# Patient Record
Sex: FEMALE | Race: WHITE | Employment: FULL TIME | URBAN - METROPOLITAN AREA
[De-identification: names, ages, dates, MRNs, and addresses within clinical notes are randomized per-mention and may not be internally consistent; named-entity substitution may affect disease eponyms.]

---

## 2017-01-06 ENCOUNTER — OFFICE VISIT (OUTPATIENT)
Dept: FAMILY MEDICINE CLINIC | Age: 57
End: 2017-01-06

## 2017-01-06 VITALS
RESPIRATION RATE: 14 BRPM | BODY MASS INDEX: 25.57 KG/M2 | SYSTOLIC BLOOD PRESSURE: 122 MMHG | HEIGHT: 61 IN | HEART RATE: 76 BPM | DIASTOLIC BLOOD PRESSURE: 84 MMHG | WEIGHT: 135.4 LBS

## 2017-01-06 DIAGNOSIS — I10 ESSENTIAL HYPERTENSION: Primary | ICD-10-CM

## 2017-01-06 DIAGNOSIS — E03.9 ACQUIRED HYPOTHYROIDISM: ICD-10-CM

## 2017-01-06 PROCEDURE — 99213 OFFICE O/P EST LOW 20 MIN: CPT | Performed by: FAMILY MEDICINE

## 2017-01-06 RX ORDER — LEVOTHYROXINE SODIUM 0.07 MG/1
75 TABLET ORAL
Qty: 90 TABLET | Refills: 1 | Status: SHIPPED | OUTPATIENT
Start: 2017-01-06 | End: 2017-05-05 | Stop reason: SDUPTHER

## 2017-01-06 RX ORDER — HYDROCHLOROTHIAZIDE 25 MG/1
25 TABLET ORAL DAILY
Qty: 90 TABLET | Refills: 1 | Status: SHIPPED | OUTPATIENT
Start: 2017-01-06 | End: 2017-05-05 | Stop reason: SDUPTHER

## 2017-01-06 ASSESSMENT — ENCOUNTER SYMPTOMS
ABDOMINAL PAIN: 0
EYES NEGATIVE: 1
SHORTNESS OF BREATH: 0
VOMITING: 0
NAUSEA: 0
COUGH: 0
DIARRHEA: 0
CHEST TIGHTNESS: 0
CONSTIPATION: 0

## 2017-05-05 ENCOUNTER — OFFICE VISIT (OUTPATIENT)
Dept: FAMILY MEDICINE CLINIC | Age: 57
End: 2017-05-05

## 2017-05-05 VITALS
SYSTOLIC BLOOD PRESSURE: 116 MMHG | WEIGHT: 135.2 LBS | RESPIRATION RATE: 14 BRPM | HEIGHT: 61 IN | DIASTOLIC BLOOD PRESSURE: 78 MMHG | BODY MASS INDEX: 25.53 KG/M2 | HEART RATE: 72 BPM

## 2017-05-05 DIAGNOSIS — Z78.0 MENOPAUSE: ICD-10-CM

## 2017-05-05 DIAGNOSIS — I10 ESSENTIAL HYPERTENSION: Primary | ICD-10-CM

## 2017-05-05 DIAGNOSIS — Z12.4 CERVICAL CANCER SCREENING: ICD-10-CM

## 2017-05-05 DIAGNOSIS — E03.9 ACQUIRED HYPOTHYROIDISM: ICD-10-CM

## 2017-05-05 DIAGNOSIS — Z01.419 PAP SMEAR, AS PART OF ROUTINE GYNECOLOGICAL EXAMINATION: ICD-10-CM

## 2017-05-05 LAB
HEMOCCULT STL QL: NORMAL

## 2017-05-05 PROCEDURE — 82270 OCCULT BLOOD FECES: CPT | Performed by: FAMILY MEDICINE

## 2017-05-05 PROCEDURE — 99214 OFFICE O/P EST MOD 30 MIN: CPT | Performed by: FAMILY MEDICINE

## 2017-05-05 RX ORDER — LISINOPRIL 2.5 MG/1
2.5 TABLET ORAL DAILY
Qty: 90 TABLET | Refills: 1 | Status: SHIPPED | OUTPATIENT
Start: 2017-05-05 | End: 2017-09-18 | Stop reason: SDUPTHER

## 2017-05-05 RX ORDER — LEVOTHYROXINE SODIUM 0.07 MG/1
75 TABLET ORAL
Qty: 90 TABLET | Refills: 1 | Status: SHIPPED | OUTPATIENT
Start: 2017-05-05 | End: 2017-09-18 | Stop reason: SDUPTHER

## 2017-05-05 RX ORDER — HYDROCHLOROTHIAZIDE 25 MG/1
25 TABLET ORAL DAILY
Qty: 90 TABLET | Refills: 1 | Status: SHIPPED | OUTPATIENT
Start: 2017-05-05 | End: 2017-09-18 | Stop reason: SDUPTHER

## 2017-05-05 ASSESSMENT — ENCOUNTER SYMPTOMS
DIARRHEA: 0
COUGH: 0
ABDOMINAL PAIN: 0
CONSTIPATION: 0
NAUSEA: 0
VOMITING: 0
CHEST TIGHTNESS: 0
EYES NEGATIVE: 1
SHORTNESS OF BREATH: 0

## 2017-05-05 ASSESSMENT — PATIENT HEALTH QUESTIONNAIRE - PHQ9
1. LITTLE INTEREST OR PLEASURE IN DOING THINGS: 0
2. FEELING DOWN, DEPRESSED OR HOPELESS: 0
SUM OF ALL RESPONSES TO PHQ QUESTIONS 1-9: 0
SUM OF ALL RESPONSES TO PHQ9 QUESTIONS 1 & 2: 0

## 2017-05-10 LAB — GYNECOLOGY CYTOLOGY REPORT: NORMAL

## 2017-05-27 LAB
CHOLESTEROL, TOTAL: NORMAL MG/DL
CHOLESTEROL/HDL RATIO: NORMAL
HDLC SERPL-MCNC: NORMAL MG/DL (ref 35–70)
LDL CHOLESTEROL CALCULATED: 113 MG/DL (ref 0–160)
TRIGL SERPL-MCNC: NORMAL MG/DL
VLDLC SERPL CALC-MCNC: NORMAL MG/DL

## 2017-05-30 DIAGNOSIS — I10 ESSENTIAL HYPERTENSION: ICD-10-CM

## 2017-09-18 ENCOUNTER — OFFICE VISIT (OUTPATIENT)
Dept: FAMILY MEDICINE CLINIC | Age: 57
End: 2017-09-18

## 2017-09-18 VITALS
DIASTOLIC BLOOD PRESSURE: 68 MMHG | RESPIRATION RATE: 14 BRPM | HEART RATE: 78 BPM | HEIGHT: 61 IN | SYSTOLIC BLOOD PRESSURE: 112 MMHG | WEIGHT: 126.8 LBS | BODY MASS INDEX: 23.94 KG/M2

## 2017-09-18 DIAGNOSIS — E03.9 ACQUIRED HYPOTHYROIDISM: ICD-10-CM

## 2017-09-18 DIAGNOSIS — Z23 IMMUNIZATION DUE: ICD-10-CM

## 2017-09-18 DIAGNOSIS — I10 ESSENTIAL HYPERTENSION: ICD-10-CM

## 2017-09-18 PROCEDURE — 99213 OFFICE O/P EST LOW 20 MIN: CPT | Performed by: FAMILY MEDICINE

## 2017-09-18 PROCEDURE — 90688 IIV4 VACCINE SPLT 0.5 ML IM: CPT | Performed by: FAMILY MEDICINE

## 2017-09-18 PROCEDURE — 90471 IMMUNIZATION ADMIN: CPT | Performed by: FAMILY MEDICINE

## 2017-09-18 RX ORDER — LEVOTHYROXINE SODIUM 0.07 MG/1
75 TABLET ORAL
Qty: 90 TABLET | Refills: 1 | Status: SHIPPED | OUTPATIENT
Start: 2017-09-18 | End: 2018-03-14 | Stop reason: SDUPTHER

## 2017-09-18 RX ORDER — HYDROCHLOROTHIAZIDE 25 MG/1
25 TABLET ORAL DAILY
Qty: 90 TABLET | Refills: 1 | Status: SHIPPED | OUTPATIENT
Start: 2017-09-18 | End: 2018-03-14 | Stop reason: SDUPTHER

## 2017-09-18 RX ORDER — LISINOPRIL 2.5 MG/1
2.5 TABLET ORAL DAILY
Qty: 90 TABLET | Refills: 1 | Status: SHIPPED | OUTPATIENT
Start: 2017-09-18 | End: 2018-03-14 | Stop reason: SDUPTHER

## 2017-09-18 ASSESSMENT — ENCOUNTER SYMPTOMS
CONSTIPATION: 0
NAUSEA: 0
SHORTNESS OF BREATH: 0
VOMITING: 0
CHEST TIGHTNESS: 0
DIARRHEA: 0
ABDOMINAL PAIN: 0
EYES NEGATIVE: 1
COUGH: 0

## 2018-03-14 ENCOUNTER — OFFICE VISIT (OUTPATIENT)
Dept: FAMILY MEDICINE CLINIC | Age: 58
End: 2018-03-14

## 2018-03-14 VITALS
BODY MASS INDEX: 24.7 KG/M2 | HEART RATE: 84 BPM | SYSTOLIC BLOOD PRESSURE: 104 MMHG | RESPIRATION RATE: 14 BRPM | WEIGHT: 130.8 LBS | DIASTOLIC BLOOD PRESSURE: 68 MMHG | HEIGHT: 61 IN

## 2018-03-14 DIAGNOSIS — I10 ESSENTIAL HYPERTENSION: Primary | ICD-10-CM

## 2018-03-14 DIAGNOSIS — E03.9 ACQUIRED HYPOTHYROIDISM: ICD-10-CM

## 2018-03-14 PROCEDURE — 99213 OFFICE O/P EST LOW 20 MIN: CPT | Performed by: FAMILY MEDICINE

## 2018-03-14 RX ORDER — HYDROCHLOROTHIAZIDE 25 MG/1
25 TABLET ORAL DAILY
Qty: 90 TABLET | Refills: 1 | Status: SHIPPED | OUTPATIENT
Start: 2018-03-14 | End: 2018-07-20 | Stop reason: SDUPTHER

## 2018-03-14 RX ORDER — LISINOPRIL 2.5 MG/1
2.5 TABLET ORAL DAILY
Qty: 90 TABLET | Refills: 1 | Status: SHIPPED | OUTPATIENT
Start: 2018-03-14 | End: 2018-07-20 | Stop reason: SDUPTHER

## 2018-03-14 RX ORDER — LEVOTHYROXINE SODIUM 0.07 MG/1
75 TABLET ORAL
Qty: 90 TABLET | Refills: 1 | Status: SHIPPED | OUTPATIENT
Start: 2018-03-14 | End: 2018-07-20 | Stop reason: SDUPTHER

## 2018-03-14 ASSESSMENT — ENCOUNTER SYMPTOMS
SHORTNESS OF BREATH: 0
ABDOMINAL PAIN: 0
NAUSEA: 0
COUGH: 0
CONSTIPATION: 0
CHEST TIGHTNESS: 0
EYES NEGATIVE: 1
DIARRHEA: 0
VOMITING: 0

## 2018-03-14 NOTE — PROGRESS NOTES
Visit Date: 3/14/2018    Amee Zaidi is a 61 y.o. female who presents today for:  Chief Complaint   Patient presents with    Hypertension stable    Hypothyroidism  She moved in with her boyfriend and she is very happy. HPI:     HPI    has a current medication list which includes the following prescription(s): hydrochlorothiazide, levothyroxine, lisinopril, bisacodyl, polyethylene glycol, ketotifen fumarate, calcium carbonate-vitamin d, aspirin, and multiple vitamin. Allergies   Allergen Reactions    Morphine Nausea And Vomiting       Social History   Substance Use Topics    Smoking status: Never Smoker    Smokeless tobacco: Never Used    Alcohol use No       Past Medical History:   Diagnosis Date    Anemia     Diverticulosis 10/10    Hypertension 5/06    Hypothyroidism 10/14/05    Mild carotid artery disease ([address] 7/06    Unspecified sleep apnea 10/05       Past Surgical History:   Procedure Laterality Date    BLADDER SUSPENSION      COLONOSCOPY  10/10 01/11    DILATION AND CURETTAGE OF UTERUS  1985/ 1988    HYSTERECTOMY  6/01    TUBAL LIGATION  1989       Family History   Problem Relation Age of Onset    Cancer Mother     Lung Cancer Mother     Heart Disease Father      Subjective:      Review of Systems   Constitutional: Negative for activity change, appetite change, diaphoresis, fatigue and fever. HENT: Negative. Eyes: Negative. Respiratory: Negative for cough, chest tightness and shortness of breath. Cardiovascular: Negative for chest pain, palpitations and leg swelling. Gastrointestinal: Negative for abdominal pain, constipation, diarrhea, nausea and vomiting. Genitourinary: Negative. Musculoskeletal: Negative. Skin: Negative. Negative for rash. Neurological: Negative for dizziness, syncope, weakness, light-headedness, numbness and headaches. Psychiatric/Behavioral: Negative.         Objective:   /68 (Site: Right Arm, Position: Sitting, Cuff weekly    lisinopril (PRINIVIL;ZESTRIL) 2.5 MG tablet 90 tablet 1     Sig: Take 1 tablet by mouth daily     Current Outpatient Prescriptions   Medication Sig Dispense Refill    hydrochlorothiazide (HYDRODIURIL) 25 MG tablet Take 1 tablet by mouth daily 90 tablet 1    levothyroxine (LEVOTHROID) 75 MCG tablet Take 1 tablet by mouth Six times weekly 90 tablet 1    lisinopril (PRINIVIL;ZESTRIL) 2.5 MG tablet Take 1 tablet by mouth daily 90 tablet 1    bisacodyl (DULCOLAX) 5 MG EC tablet See Prep Instructions 4 tablet 0    polyethylene glycol (GLYCOLAX) powder Colonoscopy Prep Dispense 255 Gram Bottle. Use as Directed 255 g 0    Ketotifen Fumarate (ALLERGY EYE DROPS OP) Apply 1 drop to eye 2 times daily.  Calcium Carbonate-Vitamin D (CALCIUM + D) 600-200 MG-UNIT TABS   Take 1 tablet by mouth daily       ASPIRIN Take 325 mg by mouth daily.  Multiple Vitamin (MULTIVITAMINS PO)   Take 1 tablet by mouth daily        No current facility-administered medications for this visit. Orders Placed This Encounter   Procedures    Comprehensive Metabolic Panel     Standing Status:   Future     Standing Expiration Date:   3/14/2019    Lipid Panel     Standing Status:   Future     Standing Expiration Date:   3/14/2019     Order Specific Question:   Is Patient Fasting?/# of Hours     Answer:   yes 12 hours    TSH without Reflex     Standing Status:   Future     Standing Expiration Date:   3/14/2019    CBC with Differential     Standing Status:   Future     Standing Expiration Date:   3/14/2019     Continue current medications. Return in about 4 months (around 7/14/2018) for HTN. Discussed use, benefit, and side effects of prescribed medications. All patient questions answered. Pt voiced understanding. Instructed to continue current medications, diet and exercise. Patient agreed with treatment plan.

## 2018-03-23 ENCOUNTER — TELEPHONE (OUTPATIENT)
Dept: FAMILY MEDICINE CLINIC | Age: 58
End: 2018-03-23

## 2018-03-23 DIAGNOSIS — E03.9 ACQUIRED HYPOTHYROIDISM: ICD-10-CM

## 2018-03-23 DIAGNOSIS — I10 ESSENTIAL HYPERTENSION: ICD-10-CM

## 2018-03-23 LAB
CHOLESTEROL, TOTAL: NORMAL MG/DL
CHOLESTEROL/HDL RATIO: NORMAL
HDLC SERPL-MCNC: NORMAL MG/DL (ref 35–70)
LDL CHOLESTEROL CALCULATED: 96 MG/DL (ref 0–160)
TRIGL SERPL-MCNC: NORMAL MG/DL
VLDLC SERPL CALC-MCNC: NORMAL MG/DL

## 2018-07-20 ENCOUNTER — OFFICE VISIT (OUTPATIENT)
Dept: FAMILY MEDICINE CLINIC | Age: 58
End: 2018-07-20

## 2018-07-20 VITALS
SYSTOLIC BLOOD PRESSURE: 102 MMHG | HEART RATE: 76 BPM | RESPIRATION RATE: 14 BRPM | HEIGHT: 61 IN | BODY MASS INDEX: 24.47 KG/M2 | DIASTOLIC BLOOD PRESSURE: 68 MMHG | WEIGHT: 129.6 LBS

## 2018-07-20 DIAGNOSIS — Z12.39 BREAST CANCER SCREENING: ICD-10-CM

## 2018-07-20 DIAGNOSIS — E03.9 ACQUIRED HYPOTHYROIDISM: ICD-10-CM

## 2018-07-20 DIAGNOSIS — J34.89 LESION OF NOSE: ICD-10-CM

## 2018-07-20 DIAGNOSIS — I10 ESSENTIAL HYPERTENSION: Primary | ICD-10-CM

## 2018-07-20 PROCEDURE — 99213 OFFICE O/P EST LOW 20 MIN: CPT | Performed by: FAMILY MEDICINE

## 2018-07-20 RX ORDER — LEVOTHYROXINE SODIUM 0.07 MG/1
75 TABLET ORAL
Qty: 90 TABLET | Refills: 1 | Status: SHIPPED | OUTPATIENT
Start: 2018-07-20 | End: 2018-07-23 | Stop reason: DRUGHIGH

## 2018-07-20 RX ORDER — LISINOPRIL 2.5 MG/1
2.5 TABLET ORAL DAILY
Qty: 90 TABLET | Refills: 1 | Status: SHIPPED | OUTPATIENT
Start: 2018-07-20 | End: 2018-11-30 | Stop reason: ALTCHOICE

## 2018-07-20 RX ORDER — HYDROCHLOROTHIAZIDE 25 MG/1
25 TABLET ORAL DAILY
Qty: 90 TABLET | Refills: 1 | Status: SHIPPED | OUTPATIENT
Start: 2018-07-20 | End: 2018-11-30 | Stop reason: SDUPTHER

## 2018-07-20 ASSESSMENT — ENCOUNTER SYMPTOMS
NAUSEA: 0
ABDOMINAL PAIN: 0
VOMITING: 0
CHEST TIGHTNESS: 0
CONSTIPATION: 0
EYES NEGATIVE: 1
COUGH: 0
SHORTNESS OF BREATH: 0
DIARRHEA: 0

## 2018-07-20 ASSESSMENT — PATIENT HEALTH QUESTIONNAIRE - PHQ9
SUM OF ALL RESPONSES TO PHQ9 QUESTIONS 1 & 2: 0
1. LITTLE INTEREST OR PLEASURE IN DOING THINGS: 0
2. FEELING DOWN, DEPRESSED OR HOPELESS: 0
SUM OF ALL RESPONSES TO PHQ QUESTIONS 1-9: 0

## 2018-07-20 NOTE — PROGRESS NOTES
Adult)   Pulse 76   Resp 14   Ht 5' 1\" (1.549 m)   Wt 129 lb 9.6 oz (58.8 kg)   Breastfeeding? No   BMI 24.49 kg/m²   Wt Readings from Last 3 Encounters:   07/20/18 129 lb 9.6 oz (58.8 kg)   03/14/18 130 lb 12.8 oz (59.3 kg)   09/18/17 126 lb 12.8 oz (57.5 kg)     Physical Exam   Constitutional: She is oriented to person, place, and time. She appears well-developed and well-nourished. No distress. HENT:   Head: Normocephalic and atraumatic. Eyes: Conjunctivae and EOM are normal. Pupils are equal, round, and reactive to light. Right eye exhibits no discharge. Left eye exhibits no discharge. No scleral icterus. Neck: Normal range of motion. Neck supple. No JVD present. No thyromegaly present. Cardiovascular: Normal rate, regular rhythm and normal heart sounds. No murmur heard. Pulmonary/Chest: Breath sounds normal. No respiratory distress. She has no wheezes. She has no rhonchi. She has no rales. Abdominal: Soft. Bowel sounds are normal. She exhibits no distension and no mass. There is no hepatosplenomegaly. There is no tenderness. There is no rebound and no guarding. Musculoskeletal: Normal range of motion. Lymphadenopathy:     She has no cervical adenopathy. Neurological: She is alert and oriented to person, place, and time. Skin: Skin is warm and dry. She is not diaphoretic. She has a nose lesion that needs to be further evaluated. Psychiatric: She has a normal mood and affect. Her behavior is normal.   Nursing note and vitals reviewed. Assessment:       Diagnosis Orders   1. Essential hypertension     2. Acquired hypothyroidism  levothyroxine (LEVOTHROID) 75 MCG tablet    TSH without Reflex   3. Lesion of nose  Kelli Walton MD, Cindy Marks MD   4.  Breast cancer screening  VIVIANE DIGITAL SCREEN W CAD BILATERAL       Plan:      Requested Prescriptions     Signed Prescriptions Disp Refills    lisinopril (PRINIVIL;ZESTRIL) 2.5 MG tablet 90 tablet 1     Sig: Take 1 tablet by mouth daily    levothyroxine (LEVOTHROID) 75 MCG tablet 90 tablet 1     Sig: Take 1 tablet by mouth Six times weekly    hydrochlorothiazide (HYDRODIURIL) 25 MG tablet 90 tablet 1     Sig: Take 1 tablet by mouth daily     Current Outpatient Prescriptions   Medication Sig Dispense Refill    lisinopril (PRINIVIL;ZESTRIL) 2.5 MG tablet Take 1 tablet by mouth daily 90 tablet 1    levothyroxine (LEVOTHROID) 75 MCG tablet Take 1 tablet by mouth Six times weekly 90 tablet 1    hydrochlorothiazide (HYDRODIURIL) 25 MG tablet Take 1 tablet by mouth daily 90 tablet 1    Ketotifen Fumarate (ALLERGY EYE DROPS OP) Apply 1 drop to eye 2 times daily.  Calcium Carbonate-Vitamin D (CALCIUM + D) 600-200 MG-UNIT TABS   Take 1 tablet by mouth daily       ASPIRIN Take 325 mg by mouth daily.  Multiple Vitamin (MULTIVITAMINS PO)   Take 1 tablet by mouth daily        No current facility-administered medications for this visit. Orders Placed This Encounter   Procedures    VIVIANE DIGITAL SCREEN W CAD BILATERAL     Standing Status:   Future     Standing Expiration Date:   7/20/2019    TSH without Reflex     Standing Status:   Future     Standing Expiration Date:   7/20/2019   Olya Mtz MD, Marianne Gordon MD     Referral Priority:   Routine     Referral Type:   Eval and Treat     Referral Reason:   Specialty Services Required     Referred to Provider:   Ely Mccann MD     Requested Specialty:   Dermatology     Number of Visits Requested:   1     Continue current medications. Return in about 4 months (around 11/20/2018) for HTN. Discussed use, benefit, and side effects of prescribed medications. All patient questions answered. Pt voiced understanding. Instructed to continue current medications, diet and exercise. Patient agreed with treatment plan.

## 2018-07-23 ENCOUNTER — TELEPHONE (OUTPATIENT)
Dept: FAMILY MEDICINE CLINIC | Age: 58
End: 2018-07-23

## 2018-07-23 DIAGNOSIS — E03.9 ACQUIRED HYPOTHYROIDISM: ICD-10-CM

## 2018-07-23 DIAGNOSIS — E03.9 ACQUIRED HYPOTHYROIDISM: Primary | ICD-10-CM

## 2018-07-23 RX ORDER — LEVOTHYROXINE SODIUM 0.1 MG/1
100 TABLET ORAL DAILY
Qty: 30 TABLET | Refills: 3 | Status: SHIPPED | OUTPATIENT
Start: 2018-07-23 | End: 2018-10-08 | Stop reason: DRUGHIGH

## 2018-10-08 ENCOUNTER — TELEPHONE (OUTPATIENT)
Dept: FAMILY MEDICINE CLINIC | Age: 58
End: 2018-10-08

## 2018-10-08 DIAGNOSIS — E03.9 ACQUIRED HYPOTHYROIDISM: ICD-10-CM

## 2018-10-08 DIAGNOSIS — E03.9 ACQUIRED HYPOTHYROIDISM: Primary | ICD-10-CM

## 2018-10-08 RX ORDER — LEVOTHYROXINE SODIUM 0.07 MG/1
75 TABLET ORAL DAILY
Qty: 30 TABLET | Refills: 3 | Status: SHIPPED | OUTPATIENT
Start: 2018-10-08 | End: 2019-01-07

## 2018-11-30 ENCOUNTER — OFFICE VISIT (OUTPATIENT)
Dept: FAMILY MEDICINE CLINIC | Age: 58
End: 2018-11-30

## 2018-11-30 VITALS
BODY MASS INDEX: 25.94 KG/M2 | DIASTOLIC BLOOD PRESSURE: 68 MMHG | RESPIRATION RATE: 14 BRPM | HEIGHT: 61 IN | SYSTOLIC BLOOD PRESSURE: 92 MMHG | WEIGHT: 137.4 LBS | HEART RATE: 74 BPM

## 2018-11-30 DIAGNOSIS — Z12.11 SCREEN FOR COLON CANCER: ICD-10-CM

## 2018-11-30 DIAGNOSIS — E03.9 ACQUIRED HYPOTHYROIDISM: ICD-10-CM

## 2018-11-30 DIAGNOSIS — I10 ESSENTIAL HYPERTENSION: Primary | ICD-10-CM

## 2018-11-30 LAB
HEMOCCULT STL QL: NORMAL

## 2018-11-30 PROCEDURE — 82270 OCCULT BLOOD FECES: CPT | Performed by: FAMILY MEDICINE

## 2018-11-30 PROCEDURE — 99213 OFFICE O/P EST LOW 20 MIN: CPT | Performed by: FAMILY MEDICINE

## 2018-11-30 RX ORDER — HYDROCHLOROTHIAZIDE 25 MG/1
25 TABLET ORAL DAILY
Qty: 90 TABLET | Refills: 1 | Status: SHIPPED | OUTPATIENT
Start: 2018-11-30 | End: 2019-04-05 | Stop reason: SDUPTHER

## 2018-11-30 RX ORDER — LISINOPRIL 2.5 MG/1
2.5 TABLET ORAL DAILY
Qty: 90 TABLET | Refills: 1 | Status: CANCELLED | OUTPATIENT
Start: 2018-11-30

## 2018-11-30 ASSESSMENT — ENCOUNTER SYMPTOMS
ABDOMINAL PAIN: 0
EYES NEGATIVE: 1
CONSTIPATION: 0
NAUSEA: 0
SHORTNESS OF BREATH: 0
CHEST TIGHTNESS: 0
DIARRHEA: 0
VOMITING: 0
COUGH: 0

## 2018-12-13 ENCOUNTER — NURSE ONLY (OUTPATIENT)
Dept: FAMILY MEDICINE CLINIC | Age: 58
End: 2018-12-13

## 2018-12-13 VITALS — DIASTOLIC BLOOD PRESSURE: 78 MMHG | SYSTOLIC BLOOD PRESSURE: 138 MMHG

## 2018-12-17 DIAGNOSIS — E03.9 ACQUIRED HYPOTHYROIDISM: ICD-10-CM

## 2019-01-05 DIAGNOSIS — E03.9 ACQUIRED HYPOTHYROIDISM: ICD-10-CM

## 2019-01-07 RX ORDER — LEVOTHYROXINE SODIUM 0.07 MG/1
TABLET ORAL
Qty: 90 TABLET | Refills: 1 | Status: SHIPPED | OUTPATIENT
Start: 2019-01-07 | End: 2019-04-05 | Stop reason: SDUPTHER

## 2019-04-05 ENCOUNTER — OFFICE VISIT (OUTPATIENT)
Dept: FAMILY MEDICINE CLINIC | Age: 59
End: 2019-04-05

## 2019-04-05 VITALS
SYSTOLIC BLOOD PRESSURE: 130 MMHG | HEART RATE: 72 BPM | BODY MASS INDEX: 27 KG/M2 | HEIGHT: 61 IN | DIASTOLIC BLOOD PRESSURE: 82 MMHG | RESPIRATION RATE: 14 BRPM | WEIGHT: 143 LBS

## 2019-04-05 DIAGNOSIS — I10 ESSENTIAL HYPERTENSION: Primary | ICD-10-CM

## 2019-04-05 DIAGNOSIS — E03.9 ACQUIRED HYPOTHYROIDISM: ICD-10-CM

## 2019-04-05 LAB
CHOLESTEROL, TOTAL: NORMAL MG/DL
CHOLESTEROL/HDL RATIO: NORMAL
HDLC SERPL-MCNC: NORMAL MG/DL (ref 35–70)
LDL CHOLESTEROL CALCULATED: 110 MG/DL (ref 0–160)
TRIGL SERPL-MCNC: NORMAL MG/DL
VLDLC SERPL CALC-MCNC: NORMAL MG/DL

## 2019-04-05 PROCEDURE — 99213 OFFICE O/P EST LOW 20 MIN: CPT | Performed by: FAMILY MEDICINE

## 2019-04-05 RX ORDER — HYDROCHLOROTHIAZIDE 25 MG/1
25 TABLET ORAL DAILY
Qty: 90 TABLET | Refills: 1 | Status: SHIPPED | OUTPATIENT
Start: 2019-04-05 | End: 2019-12-20 | Stop reason: SDUPTHER

## 2019-04-05 RX ORDER — CYCLOSPORINE 0.5 MG/ML
1 EMULSION OPHTHALMIC 2 TIMES DAILY
COMMUNITY

## 2019-04-05 RX ORDER — LEVOTHYROXINE SODIUM 0.07 MG/1
75 TABLET ORAL DAILY
Qty: 90 TABLET | Refills: 1 | Status: SHIPPED | OUTPATIENT
Start: 2019-04-05 | End: 2019-11-14 | Stop reason: SDUPTHER

## 2019-04-05 ASSESSMENT — ENCOUNTER SYMPTOMS
COUGH: 0
CHEST TIGHTNESS: 0
SHORTNESS OF BREATH: 0
CONSTIPATION: 0
DIARRHEA: 0
EYES NEGATIVE: 1
VOMITING: 0
NAUSEA: 0
ABDOMINAL PAIN: 0

## 2019-04-05 NOTE — PROGRESS NOTES
Date: 4/5/2019    Regina Ford is a 60 y.o. female who presents today for:  Chief Complaint   Patient presents with    Hypertension stable    Hypothyroidism last blood work was in December. She is doing good. HPI:     HPI    has a current medication list which includes the following prescription(s): cyclosporine, hydrochlorothiazide, levothyroxine, calcium carbonate-vitamin d, aspirin, and multiple vitamin. Allergies   Allergen Reactions    Morphine Nausea And Vomiting       Social History     Tobacco Use    Smoking status: Never Smoker    Smokeless tobacco: Never Used   Substance Use Topics    Alcohol use: No    Drug use: No       Past Medical History:   Diagnosis Date    Anemia     Diverticulosis 10/10    Hypertension 5/06    Hypothyroidism 10/14/05    Mild carotid artery disease ([address] 7/06    Unspecified sleep apnea 10/05       Past Surgical History:   Procedure Laterality Date    BLADDER SUSPENSION      COLONOSCOPY  10/10 01/11    DILATION AND CURETTAGE OF UTERUS  1985/ 1988    HYSTERECTOMY  6/01    TUBAL LIGATION  1989       Family History   Problem Relation Age of Onset    Cancer Mother     Lung Cancer Mother     Heart Disease Father      Subjective:     Review of Systems   Constitutional: Negative for activity change, appetite change, diaphoresis, fatigue and fever. HENT: Negative. Eyes: Negative. Respiratory: Negative for cough, chest tightness and shortness of breath. Cardiovascular: Negative for chest pain, palpitations and leg swelling. Gastrointestinal: Negative for abdominal pain, constipation, diarrhea, nausea and vomiting. Genitourinary: Negative. Musculoskeletal: Negative. Skin: Negative. Negative for rash. Neurological: Negative for dizziness, syncope, weakness, light-headedness, numbness and headaches.    Psychiatric/Behavioral: Negative.        :   /82 (Site: Left Upper Arm, Position: Sitting, Cuff Size: Medium Adult)   Pulse 72 Resp 14   Ht 5' 1\" (1.549 m)   Wt 143 lb (64.9 kg)   Breastfeeding? No   BMI 27.02 kg/m²   Wt Readings from Last 3 Encounters:   04/05/19 143 lb (64.9 kg)   11/30/18 137 lb 6.4 oz (62.3 kg)   07/20/18 129 lb 9.6 oz (58.8 kg)     Physical Exam   Constitutional: She is oriented to person, place, and time. She appears well-developed and well-nourished. No distress. HENT:   Head: Normocephalic and atraumatic. Eyes: Pupils are equal, round, and reactive to light. Conjunctivae and EOM are normal. Right eye exhibits no discharge. Left eye exhibits no discharge. No scleral icterus. Neck: Normal range of motion. Neck supple. No JVD present. No thyromegaly present. Cardiovascular: Normal rate, regular rhythm and normal heart sounds. No murmur heard. Pulmonary/Chest: Breath sounds normal. No respiratory distress. She has no wheezes. She has no rhonchi. She has no rales. Abdominal: Soft. Bowel sounds are normal. She exhibits no distension and no mass. There is no hepatosplenomegaly. There is no tenderness. There is no rebound and no guarding. Musculoskeletal: Normal range of motion. Lymphadenopathy:     She has no cervical adenopathy. Neurological: She is alert and oriented to person, place, and time. Skin: Skin is warm and dry. No rash noted. She is not diaphoretic. Psychiatric: She has a normal mood and affect. Her behavior is normal.   Nursing note and vitals reviewed.    :       Diagnosis Orders   1. Essential hypertension  Comprehensive Metabolic Panel    CBC Auto Differential    Lipid Panel   2.  Acquired hypothyroidism  levothyroxine (SYNTHROID) 75 MCG tablet       :      Requested Prescriptions     Signed Prescriptions Disp Refills    hydrochlorothiazide (HYDRODIURIL) 25 MG tablet 90 tablet 1     Sig: Take 1 tablet by mouth daily    levothyroxine (SYNTHROID) 75 MCG tablet 90 tablet 1     Sig: Take 1 tablet by mouth Daily     Current Outpatient Medications   Medication Sig Dispense Refill  cycloSPORINE (RESTASIS) 0.05 % ophthalmic emulsion 1 drop 2 times daily      hydrochlorothiazide (HYDRODIURIL) 25 MG tablet Take 1 tablet by mouth daily 90 tablet 1    levothyroxine (SYNTHROID) 75 MCG tablet Take 1 tablet by mouth Daily 90 tablet 1    Calcium Carbonate-Vitamin D (CALCIUM + D) 600-200 MG-UNIT TABS   Take 1 tablet by mouth daily       ASPIRIN Take 325 mg by mouth daily.  Multiple Vitamin (MULTIVITAMINS PO)   Take 1 tablet by mouth daily        No current facility-administered medications for this visit. Orders Placed This Encounter   Procedures    Comprehensive Metabolic Panel     Standing Status:   Future     Standing Expiration Date:   4/4/2020    CBC Auto Differential     Standing Status:   Future     Standing Expiration Date:   4/5/2020    Lipid Panel     Standing Status:   Future     Standing Expiration Date:   4/4/2020     Order Specific Question:   Is Patient Fasting?/# of Hours     Answer:   yes 12 hours       Continue current medications. Return in about 4 months (around 8/5/2019). Discussed use, benefit, and side effects of prescribed medications. All patient questions answered. Pt voiced understanding. Instructed to continue current medications,diet and exercise. Patient agreed with treatment plan.

## 2019-04-08 ENCOUNTER — TELEPHONE (OUTPATIENT)
Dept: FAMILY MEDICINE CLINIC | Age: 59
End: 2019-04-08

## 2019-04-08 DIAGNOSIS — R89.9 ABNORMAL LABORATORY TEST RESULT: Primary | ICD-10-CM

## 2019-04-08 DIAGNOSIS — I10 ESSENTIAL HYPERTENSION: ICD-10-CM

## 2019-06-14 DIAGNOSIS — R89.9 ABNORMAL LABORATORY TEST RESULT: ICD-10-CM

## 2019-08-09 ENCOUNTER — OFFICE VISIT (OUTPATIENT)
Dept: FAMILY MEDICINE CLINIC | Age: 59
End: 2019-08-09

## 2019-08-09 VITALS
RESPIRATION RATE: 14 BRPM | HEIGHT: 61 IN | DIASTOLIC BLOOD PRESSURE: 88 MMHG | BODY MASS INDEX: 26.51 KG/M2 | HEART RATE: 86 BPM | WEIGHT: 140.4 LBS | SYSTOLIC BLOOD PRESSURE: 138 MMHG

## 2019-08-09 DIAGNOSIS — I10 ESSENTIAL HYPERTENSION: Primary | ICD-10-CM

## 2019-08-09 DIAGNOSIS — E03.9 ACQUIRED HYPOTHYROIDISM: ICD-10-CM

## 2019-08-09 PROCEDURE — 99213 OFFICE O/P EST LOW 20 MIN: CPT | Performed by: FAMILY MEDICINE

## 2019-08-09 ASSESSMENT — ENCOUNTER SYMPTOMS
SHORTNESS OF BREATH: 0
DIARRHEA: 0
CONSTIPATION: 0
VOMITING: 0
ABDOMINAL PAIN: 0
CHEST TIGHTNESS: 0
EYES NEGATIVE: 1
COUGH: 0
NAUSEA: 0

## 2019-08-09 ASSESSMENT — PATIENT HEALTH QUESTIONNAIRE - PHQ9
1. LITTLE INTEREST OR PLEASURE IN DOING THINGS: 0
SUM OF ALL RESPONSES TO PHQ QUESTIONS 1-9: 0
SUM OF ALL RESPONSES TO PHQ9 QUESTIONS 1 & 2: 0
2. FEELING DOWN, DEPRESSED OR HOPELESS: 0
SUM OF ALL RESPONSES TO PHQ QUESTIONS 1-9: 0

## 2019-08-09 NOTE — PROGRESS NOTES
Date: 8/9/2019    Fozia Escobar is a 60 y.o. female who presents today for:  Chief Complaint   Patient presents with    Hypertension she states that she was really rushing to come here this AM.     Hypothyroidism  She states that she is doing no concerns. HPI:     HPI    has a current medication list which includes the following prescription(s): cyclosporine, hydrochlorothiazide, levothyroxine, calcium carbonate-vitamin d, aspirin, and multiple vitamin. Allergies   Allergen Reactions    Morphine Nausea And Vomiting       Social History     Tobacco Use    Smoking status: Never Smoker    Smokeless tobacco: Never Used   Substance Use Topics    Alcohol use: No    Drug use: No       Past Medical History:   Diagnosis Date    Anemia     Diverticulosis 10/10    Hypertension 5/06    Hypothyroidism 10/14/05    Mild carotid artery disease ([address] 7/06    Unspecified sleep apnea 10/05       Past Surgical History:   Procedure Laterality Date    BLADDER SUSPENSION      COLONOSCOPY  10/10 01/11    DILATION AND CURETTAGE OF UTERUS  1985/ 1988    HYSTERECTOMY  6/01    TUBAL LIGATION  1989       Family History   Problem Relation Age of Onset    Cancer Mother     Lung Cancer Mother     Heart Disease Father      Subjective:     Review of Systems   Constitutional: Negative for activity change, appetite change, diaphoresis, fatigue and fever. HENT: Negative. Eyes: Negative. Respiratory: Negative for cough, chest tightness and shortness of breath. Cardiovascular: Negative for chest pain, palpitations and leg swelling. Gastrointestinal: Negative for abdominal pain, constipation, diarrhea, nausea and vomiting. Genitourinary: Negative. Musculoskeletal: Negative. Skin: Negative. Negative for rash. Neurological: Negative for dizziness, syncope, weakness, light-headedness, numbness and headaches.    Psychiatric/Behavioral: Negative.        :   /88 (Site: Left Upper Arm, Position: by mouth Daily 90 tablet 1    Calcium Carbonate-Vitamin D (CALCIUM + D) 600-200 MG-UNIT TABS   Take 1 tablet by mouth daily       ASPIRIN Take 325 mg by mouth daily.  Multiple Vitamin (MULTIVITAMINS PO)   Take 1 tablet by mouth daily        No current facility-administered medications for this visit. No orders of the defined types were placed in this encounter. Continue current medications. B/P recheck next week. Return in about 4 months (around 12/9/2019) for HTN. Discussed use, benefit, and side effects of prescribed medications. All patient questions answered. Pt voiced understanding. Instructed to continue current medications,diet and exercise. Patient agreed with treatment plan.

## 2019-08-16 ENCOUNTER — NURSE ONLY (OUTPATIENT)
Dept: FAMILY MEDICINE CLINIC | Age: 59
End: 2019-08-16

## 2019-08-16 VITALS — HEART RATE: 66 BPM | DIASTOLIC BLOOD PRESSURE: 82 MMHG | SYSTOLIC BLOOD PRESSURE: 150 MMHG

## 2019-11-14 DIAGNOSIS — E03.9 ACQUIRED HYPOTHYROIDISM: ICD-10-CM

## 2019-11-15 RX ORDER — LEVOTHYROXINE SODIUM 0.07 MG/1
TABLET ORAL
Qty: 90 TABLET | Refills: 0 | Status: SHIPPED | OUTPATIENT
Start: 2019-11-15 | End: 2019-12-20 | Stop reason: SDUPTHER

## 2019-12-20 ENCOUNTER — OFFICE VISIT (OUTPATIENT)
Dept: FAMILY MEDICINE CLINIC | Age: 59
End: 2019-12-20

## 2019-12-20 VITALS
RESPIRATION RATE: 16 BRPM | WEIGHT: 143.38 LBS | HEART RATE: 78 BPM | HEIGHT: 61 IN | DIASTOLIC BLOOD PRESSURE: 88 MMHG | BODY MASS INDEX: 27.07 KG/M2 | SYSTOLIC BLOOD PRESSURE: 160 MMHG

## 2019-12-20 DIAGNOSIS — E03.9 ACQUIRED HYPOTHYROIDISM: ICD-10-CM

## 2019-12-20 DIAGNOSIS — I10 ESSENTIAL HYPERTENSION: Primary | ICD-10-CM

## 2019-12-20 DIAGNOSIS — F51.02 ADJUSTMENT INSOMNIA: ICD-10-CM

## 2019-12-20 LAB
CHOLESTEROL, TOTAL: NORMAL
CHOLESTEROL/HDL RATIO: NORMAL
HDLC SERPL-MCNC: NORMAL MG/DL
LDL CHOLESTEROL CALCULATED: 101 MG/DL (ref 0–160)
TRIGL SERPL-MCNC: NORMAL MG/DL
VLDLC SERPL CALC-MCNC: NORMAL MG/DL

## 2019-12-20 PROCEDURE — 99213 OFFICE O/P EST LOW 20 MIN: CPT | Performed by: FAMILY MEDICINE

## 2019-12-20 RX ORDER — HYDROCHLOROTHIAZIDE 25 MG/1
25 TABLET ORAL DAILY
Qty: 90 TABLET | Refills: 1 | Status: SHIPPED | OUTPATIENT
Start: 2019-12-20 | End: 2020-06-26 | Stop reason: SDUPTHER

## 2019-12-20 RX ORDER — LEVOTHYROXINE SODIUM 0.07 MG/1
TABLET ORAL
Qty: 90 TABLET | Refills: 1 | Status: SHIPPED | OUTPATIENT
Start: 2019-12-20 | End: 2020-06-26 | Stop reason: SDUPTHER

## 2019-12-20 RX ORDER — LISINOPRIL 5 MG/1
2.5 TABLET ORAL DAILY
Qty: 45 TABLET | Refills: 1 | Status: SHIPPED | OUTPATIENT
Start: 2019-12-20 | End: 2020-06-26

## 2019-12-20 ASSESSMENT — ENCOUNTER SYMPTOMS
ABDOMINAL PAIN: 0
DIARRHEA: 0
EYES NEGATIVE: 1
COUGH: 0
VOMITING: 0
CHEST TIGHTNESS: 0
NAUSEA: 0
CONSTIPATION: 0
SHORTNESS OF BREATH: 0

## 2019-12-23 DIAGNOSIS — I10 ESSENTIAL HYPERTENSION: ICD-10-CM

## 2019-12-23 DIAGNOSIS — E03.9 ACQUIRED HYPOTHYROIDISM: ICD-10-CM

## 2020-02-21 ENCOUNTER — OFFICE VISIT (OUTPATIENT)
Dept: FAMILY MEDICINE CLINIC | Age: 60
End: 2020-02-21

## 2020-02-21 VITALS
SYSTOLIC BLOOD PRESSURE: 118 MMHG | HEART RATE: 80 BPM | DIASTOLIC BLOOD PRESSURE: 78 MMHG | RESPIRATION RATE: 16 BRPM | HEIGHT: 61 IN | WEIGHT: 144.25 LBS | BODY MASS INDEX: 27.23 KG/M2

## 2020-02-21 PROCEDURE — 99213 OFFICE O/P EST LOW 20 MIN: CPT | Performed by: FAMILY MEDICINE

## 2020-02-21 RX ORDER — OMEPRAZOLE 20 MG/1
20 CAPSULE, DELAYED RELEASE ORAL DAILY
COMMUNITY

## 2020-02-21 ASSESSMENT — ENCOUNTER SYMPTOMS
NAUSEA: 0
COUGH: 0
EYES NEGATIVE: 1
DIARRHEA: 0
SHORTNESS OF BREATH: 0
ABDOMINAL PAIN: 0
CONSTIPATION: 0
CHEST TIGHTNESS: 0
VOMITING: 0

## 2020-02-21 ASSESSMENT — PATIENT HEALTH QUESTIONNAIRE - PHQ9
SUM OF ALL RESPONSES TO PHQ9 QUESTIONS 1 & 2: 0
1. LITTLE INTEREST OR PLEASURE IN DOING THINGS: 0
2. FEELING DOWN, DEPRESSED OR HOPELESS: 0
SUM OF ALL RESPONSES TO PHQ QUESTIONS 1-9: 0
SUM OF ALL RESPONSES TO PHQ QUESTIONS 1-9: 0

## 2020-02-21 NOTE — PROGRESS NOTES
(Site: Right Upper Arm, Position: Sitting, Cuff Size: Medium Adult)   Pulse 80   Resp 16   Ht 5' 1\" (1.549 m)   Wt 144 lb 4 oz (65.4 kg)   BMI 27.26 kg/m²   Wt Readings from Last 3 Encounters:   02/21/20 144 lb 4 oz (65.4 kg)   12/20/19 143 lb 6 oz (65 kg)   08/09/19 140 lb 6.4 oz (63.7 kg)     Physical Exam  Vitals signs and nursing note reviewed. Constitutional:       General: She is not in acute distress. Appearance: She is well-developed. She is not diaphoretic. HENT:      Head: Normocephalic and atraumatic. Eyes:      General: No scleral icterus. Right eye: No discharge. Left eye: No discharge. Conjunctiva/sclera: Conjunctivae normal.      Pupils: Pupils are equal, round, and reactive to light. Neck:      Musculoskeletal: Normal range of motion and neck supple. Thyroid: No thyromegaly. Vascular: No JVD. Cardiovascular:      Rate and Rhythm: Normal rate and regular rhythm. Heart sounds: Normal heart sounds. No murmur. Pulmonary:      Effort: No respiratory distress. Breath sounds: Normal breath sounds. No wheezing, rhonchi or rales. Abdominal:      General: Bowel sounds are normal. There is no distension. Palpations: Abdomen is soft. There is no mass. Tenderness: There is no abdominal tenderness. There is no guarding or rebound. Musculoskeletal: Normal range of motion. Lymphadenopathy:      Cervical: No cervical adenopathy. Skin:     General: Skin is warm and dry. Findings: No rash. Neurological:      Mental Status: She is alert and oriented to person, place, and time. Psychiatric:         Behavior: Behavior normal.       :       Diagnosis Orders   1. Essential hypertension     2.  Acquired hypothyroidism         :      Requested Prescriptions      No prescriptions requested or ordered in this encounter     Current Outpatient Medications   Medication Sig Dispense Refill    omeprazole (PRILOSEC) 20 MG delayed release capsule

## 2020-06-26 ENCOUNTER — OFFICE VISIT (OUTPATIENT)
Dept: FAMILY MEDICINE CLINIC | Age: 60
End: 2020-06-26

## 2020-06-26 VITALS
DIASTOLIC BLOOD PRESSURE: 76 MMHG | RESPIRATION RATE: 12 BRPM | WEIGHT: 139.4 LBS | TEMPERATURE: 97.1 F | HEART RATE: 72 BPM | BODY MASS INDEX: 26.32 KG/M2 | SYSTOLIC BLOOD PRESSURE: 124 MMHG | HEIGHT: 61 IN

## 2020-06-26 PROCEDURE — 99213 OFFICE O/P EST LOW 20 MIN: CPT | Performed by: FAMILY MEDICINE

## 2020-06-26 RX ORDER — LEVOTHYROXINE SODIUM 0.07 MG/1
TABLET ORAL
Qty: 90 TABLET | Refills: 1 | Status: SHIPPED | OUTPATIENT
Start: 2020-06-26 | End: 2020-11-06 | Stop reason: SDUPTHER

## 2020-06-26 RX ORDER — HYDROCHLOROTHIAZIDE 25 MG/1
25 TABLET ORAL DAILY
Qty: 90 TABLET | Refills: 1 | Status: SHIPPED | OUTPATIENT
Start: 2020-06-26 | End: 2020-11-06 | Stop reason: SDUPTHER

## 2020-06-26 ASSESSMENT — ENCOUNTER SYMPTOMS
CONSTIPATION: 0
DIARRHEA: 0
EYES NEGATIVE: 1
SHORTNESS OF BREATH: 0
CHEST TIGHTNESS: 0
ABDOMINAL PAIN: 0
VOMITING: 0
COUGH: 0
NAUSEA: 0

## 2020-06-26 NOTE — PROGRESS NOTES
rash.   Neurological:      Mental Status: She is alert and oriented to person, place, and time. Psychiatric:         Behavior: Behavior normal.       :       Diagnosis Orders   1. Breast cancer screening  VIVIANE DIGITAL SCREEN W CAD BILATERAL   2. Essential hypertension  hydroCHLOROthiazide (HYDRODIURIL) 25 MG tablet   3. Acquired hypothyroidism  levothyroxine (SYNTHROID) 75 MCG tablet       :      Requested Prescriptions     Signed Prescriptions Disp Refills    levothyroxine (SYNTHROID) 75 MCG tablet 90 tablet 1     Sig: TAKE 1 TABLET BY MOUTH ONCE DAILY    hydroCHLOROthiazide (HYDRODIURIL) 25 MG tablet 90 tablet 1     Sig: Take 1 tablet by mouth daily     Current Outpatient Medications   Medication Sig Dispense Refill    levothyroxine (SYNTHROID) 75 MCG tablet TAKE 1 TABLET BY MOUTH ONCE DAILY 90 tablet 1    hydroCHLOROthiazide (HYDRODIURIL) 25 MG tablet Take 1 tablet by mouth daily 90 tablet 1    omeprazole (PRILOSEC) 20 MG delayed release capsule Take 20 mg by mouth daily      Melatonin 5 MG CAPS Take 5 mg by mouth nightly 90 capsule 1    cycloSPORINE (RESTASIS) 0.05 % ophthalmic emulsion 1 drop 2 times daily      Calcium Carbonate-Vitamin D (CALCIUM + D) 600-200 MG-UNIT TABS   Take 1 tablet by mouth daily       ASPIRIN Take 325 mg by mouth daily.  Multiple Vitamin (MULTIVITAMINS PO)   Take 1 tablet by mouth daily        No current facility-administered medications for this visit. Orders Placed This Encounter   Procedures    VIVIANE DIGITAL SCREEN W CAD BILATERAL     Standing Status:   Future     Standing Expiration Date:   6/26/2021       Continue current medications. Return in about 4 months (around 10/26/2020) for HTN. Discussed use, benefit, and side effects of prescribed medications. All patient questions answered. Pt voiced understanding. Instructed to continue current medications,diet and exercise. Patient agreed with treatment plan.

## 2020-11-06 ENCOUNTER — OFFICE VISIT (OUTPATIENT)
Dept: FAMILY MEDICINE CLINIC | Age: 60
End: 2020-11-06

## 2020-11-06 VITALS
BODY MASS INDEX: 26.7 KG/M2 | HEIGHT: 60 IN | HEART RATE: 72 BPM | TEMPERATURE: 97.3 F | RESPIRATION RATE: 16 BRPM | WEIGHT: 136 LBS | DIASTOLIC BLOOD PRESSURE: 82 MMHG | SYSTOLIC BLOOD PRESSURE: 136 MMHG

## 2020-11-06 PROCEDURE — 99213 OFFICE O/P EST LOW 20 MIN: CPT | Performed by: FAMILY MEDICINE

## 2020-11-06 RX ORDER — HYDROCHLOROTHIAZIDE 25 MG/1
25 TABLET ORAL DAILY
Qty: 90 TABLET | Refills: 1 | Status: SHIPPED | OUTPATIENT
Start: 2020-11-06 | End: 2021-07-16 | Stop reason: SDUPTHER

## 2020-11-06 RX ORDER — LEVOTHYROXINE SODIUM 0.07 MG/1
TABLET ORAL
Qty: 90 TABLET | Refills: 1 | Status: SHIPPED | OUTPATIENT
Start: 2020-11-06 | End: 2021-07-16 | Stop reason: SDUPTHER

## 2020-11-06 ASSESSMENT — ENCOUNTER SYMPTOMS
NAUSEA: 0
COUGH: 0
ABDOMINAL PAIN: 0
CONSTIPATION: 0
VOMITING: 0
CHEST TIGHTNESS: 0
EYES NEGATIVE: 1
SHORTNESS OF BREATH: 0
DIARRHEA: 0

## 2020-11-06 NOTE — PROGRESS NOTES
Date: 11/6/2020    Nehal Schmidt is a 63 y.o. female who presents today for:  Chief Complaint   Patient presents with    Check-Up    Hypertension       HPI:     Hypertension:  Home blood pressure monitoring: No.  She is adherent to a low sodium diet. Patient denies chest pain, shortness of breath, headache, lightheadedness, blurred vision, peripheral edema, palpitations and dry cough. Antihypertensive medication side effects: no medication side effects noted. Use of agents associated with hypertension: thyroid hormones. has a current medication list which includes the following prescription(s): levothyroxine, hydrochlorothiazide, omeprazole, cyclosporine, calcium carbonate-vitamin d, aspirin, multiple vitamin, and melatonin. Allergies   Allergen Reactions    Morphine Nausea And Vomiting       Social History     Tobacco Use    Smoking status: Never Smoker    Smokeless tobacco: Never Used   Substance Use Topics    Alcohol use: No    Drug use: No       Past Medical History:   Diagnosis Date    Anemia     Diverticulosis 10/10    Hypertension 5/06    Hypothyroidism 10/14/05    Mild carotid artery disease ([address] 7/06    Unspecified sleep apnea 10/05       Past Surgical History:   Procedure Laterality Date    BLADDER SUSPENSION      COLONOSCOPY  10/10 01/11    DILATION AND CURETTAGE OF UTERUS  1985/ 1988    HYSTERECTOMY  6/01    TUBAL LIGATION  1989       Family History   Problem Relation Age of Onset    Cancer Mother     Lung Cancer Mother     Heart Disease Father      Subjective:     Review of Systems   Constitutional: Negative for activity change, appetite change, diaphoresis, fatigue and fever. HENT: Negative. Eyes: Negative. Respiratory: Negative for cough, chest tightness and shortness of breath. Cardiovascular: Negative for chest pain, palpitations and leg swelling.    Gastrointestinal: Negative for abdominal pain, constipation, Continue current medications. Return in about 4 months (around 3/6/2021) for HTN. Discussed use, benefit, and side effects of prescribed medications. All patient questions answered. Pt voiced understanding. Instructed to continue current medications,diet and exercise. Patient agreed with treatment plan.

## 2021-01-14 LAB
CHOLESTEROL, TOTAL: NORMAL
CHOLESTEROL/HDL RATIO: NORMAL
HDLC SERPL-MCNC: NORMAL MG/DL
LDL CHOLESTEROL CALCULATED: 116 MG/DL (ref 0–160)
NONHDLC SERPL-MCNC: NORMAL MG/DL
TRIGL SERPL-MCNC: NORMAL MG/DL
VLDLC SERPL CALC-MCNC: NORMAL MG/DL

## 2021-01-15 DIAGNOSIS — I10 ESSENTIAL HYPERTENSION: ICD-10-CM

## 2021-01-15 DIAGNOSIS — E03.9 ACQUIRED HYPOTHYROIDISM: ICD-10-CM

## 2021-03-12 ENCOUNTER — OFFICE VISIT (OUTPATIENT)
Dept: FAMILY MEDICINE CLINIC | Age: 61
End: 2021-03-12

## 2021-03-12 VITALS
SYSTOLIC BLOOD PRESSURE: 158 MMHG | DIASTOLIC BLOOD PRESSURE: 82 MMHG | WEIGHT: 145.38 LBS | RESPIRATION RATE: 16 BRPM | BODY MASS INDEX: 27.45 KG/M2 | TEMPERATURE: 96.7 F | HEIGHT: 61 IN | HEART RATE: 80 BPM

## 2021-03-12 DIAGNOSIS — I10 ESSENTIAL HYPERTENSION: Primary | ICD-10-CM

## 2021-03-12 DIAGNOSIS — Z12.11 SCREEN FOR COLON CANCER: ICD-10-CM

## 2021-03-12 DIAGNOSIS — Z90.710 PAP SMEAR OF VAGINA WITH PREVIOUS HYSTERECTOMY FOR CANCER: ICD-10-CM

## 2021-03-12 DIAGNOSIS — K21.9 GASTROESOPHAGEAL REFLUX DISEASE, UNSPECIFIED WHETHER ESOPHAGITIS PRESENT: ICD-10-CM

## 2021-03-12 DIAGNOSIS — E03.9 ACQUIRED HYPOTHYROIDISM: ICD-10-CM

## 2021-03-12 DIAGNOSIS — Z08 PAP SMEAR OF VAGINA WITH PREVIOUS HYSTERECTOMY FOR CANCER: ICD-10-CM

## 2021-03-12 LAB
HEMOCCULT STL QL: NEGATIVE
HEMOCCULT STL QL: NORMAL
HEMOCCULT STL QL: NORMAL

## 2021-03-12 PROCEDURE — 99214 OFFICE O/P EST MOD 30 MIN: CPT | Performed by: FAMILY MEDICINE

## 2021-03-12 PROCEDURE — 82270 OCCULT BLOOD FECES: CPT | Performed by: FAMILY MEDICINE

## 2021-03-12 ASSESSMENT — ENCOUNTER SYMPTOMS
DIARRHEA: 0
NAUSEA: 0
SHORTNESS OF BREATH: 0
VOMITING: 0
ABDOMINAL PAIN: 0
CONSTIPATION: 0
CHEST TIGHTNESS: 0
COUGH: 0
EYES NEGATIVE: 1

## 2021-03-12 ASSESSMENT — PATIENT HEALTH QUESTIONNAIRE - PHQ9
SUM OF ALL RESPONSES TO PHQ QUESTIONS 1-9: 0
SUM OF ALL RESPONSES TO PHQ QUESTIONS 1-9: 0
2. FEELING DOWN, DEPRESSED OR HOPELESS: 0
SUM OF ALL RESPONSES TO PHQ9 QUESTIONS 1 & 2: 0
1. LITTLE INTEREST OR PLEASURE IN DOING THINGS: 0

## 2021-03-12 NOTE — PROGRESS NOTES
Date: 3/12/2021    Staci Ralph is a 63 y.o. female who presents today for:  Chief Complaint   Patient presents with    Check-Up    Hypertension    Gynecologic Exam       HPI:     Hypertension:  Home blood pressure monitoring: No.  She is adherent to a low sodium diet. Patient denies chest pain, shortness of breath, headache, lightheadedness, blurred vision, peripheral edema, palpitations and dry cough. Antihypertensive medication side effects: no medication side effects noted. Use of agents associated with hypertension: thyroid hormones. has a current medication list which includes the following prescription(s): levothyroxine, hydrochlorothiazide, omeprazole, melatonin, cyclosporine, calcium carbonate-vitamin d, aspirin, and multiple vitamin. Allergies   Allergen Reactions    Morphine Nausea And Vomiting       Social History     Tobacco Use    Smoking status: Never Smoker    Smokeless tobacco: Never Used   Substance Use Topics    Alcohol use: No    Drug use: No       Past Medical History:   Diagnosis Date    Anemia     Diverticulosis 10/10    Hypertension 5/06    Hypothyroidism 10/14/05    Mild carotid artery disease ([address] 7/06    Unspecified sleep apnea 10/05       Past Surgical History:   Procedure Laterality Date    BLADDER SUSPENSION      COLONOSCOPY  10/10 01/11    DILATION AND CURETTAGE OF UTERUS  1985/ 1988    HYSTERECTOMY  6/01    TUBAL LIGATION  1989       Family History   Problem Relation Age of Onset    Cancer Mother     Lung Cancer Mother     Heart Disease Father      Subjective:     Review of Systems   Constitutional: Negative for activity change, appetite change, diaphoresis, fatigue and fever. HENT: Negative. Eyes: Negative. Respiratory: Negative for cough, chest tightness and shortness of breath. Cardiovascular: Negative for chest pain, palpitations and leg swelling.    Gastrointestinal: Negative for abdominal pain, constipation, diarrhea, nausea and vomiting. Genitourinary: Negative. Musculoskeletal: Negative. Skin: Negative. Negative for rash. Neurological: Negative for dizziness, syncope, weakness, light-headedness, numbness and headaches. Psychiatric/Behavioral: Negative.        :   BP (!) 158/82 (Site: Right Upper Arm, Position: Sitting, Cuff Size: Medium Adult)   Pulse 80   Temp 96.7 °F (35.9 °C) (Skin)   Resp 16   Ht 5' 1\" (1.549 m)   Wt 145 lb 6 oz (65.9 kg)   BMI 27.47 kg/m²   Wt Readings from Last 3 Encounters:   03/12/21 145 lb 6 oz (65.9 kg)   11/06/20 136 lb (61.7 kg)   06/26/20 139 lb 6.4 oz (63.2 kg)     Physical Exam  Vitals signs and nursing note reviewed. Exam conducted with a chaperone present (Kaitlin DUONG). Constitutional:       General: She is not in acute distress. Appearance: She is well-developed. She is not diaphoretic. HENT:      Head: Normocephalic and atraumatic. Right Ear: External ear normal.      Left Ear: External ear normal.      Nose: Nose normal.      Mouth/Throat:      Pharynx: No oropharyngeal exudate. Eyes:      General: No scleral icterus. Right eye: No discharge. Left eye: No discharge. Conjunctiva/sclera: Conjunctivae normal.      Pupils: Pupils are equal, round, and reactive to light. Neck:      Musculoskeletal: Normal range of motion and neck supple. Thyroid: No thyromegaly. Vascular: No JVD. Cardiovascular:      Rate and Rhythm: Normal rate and regular rhythm. Heart sounds: Normal heart sounds. No murmur. No gallop. Pulmonary:      Effort: Pulmonary effort is normal. No respiratory distress. Breath sounds: Normal breath sounds. No wheezing, rhonchi or rales. Abdominal:      General: Bowel sounds are normal. There is no distension. Palpations: Abdomen is soft. There is no mass. Tenderness: There is no abdominal tenderness. There is no guarding or rebound.       Hernia: There is no hernia in the left inguinal area or right inguinal area. Genitourinary:     Labia:         Right: No rash or lesion. Left: No rash or lesion. Vagina: Normal. No vaginal discharge, tenderness or bleeding. Uterus: Absent. Adnexa: Right adnexa normal and left adnexa normal.      Rectum: Guaiac result negative. No mass, tenderness or external hemorrhoid. Normal anal tone. Comments: No axillary or supraclavicular  lymphadenopathy. No masses palpable. No nipple retraction or discharge. Musculoskeletal: Normal range of motion. General: No tenderness. Lymphadenopathy:      Cervical: No cervical adenopathy. Lower Body: No right inguinal adenopathy. No left inguinal adenopathy. Skin:     General: Skin is warm and dry. Findings: No rash. Neurological:      Mental Status: She is alert and oriented to person, place, and time. Cranial Nerves: No cranial nerve deficit. Sensory: No sensory deficit. Coordination: Coordination normal.   Psychiatric:         Behavior: Behavior normal.       :       Diagnosis Orders   1. Essential hypertension     2. Acquired hypothyroidism     3. Gastroesophageal reflux disease, unspecified whether esophagitis present     4. Pap smear of vagina with previous hysterectomy for cancer  PAP SMEAR   5.  Screen for colon cancer  POCT occult blood stool       :      Requested Prescriptions      No prescriptions requested or ordered in this encounter     Current Outpatient Medications   Medication Sig Dispense Refill    levothyroxine (SYNTHROID) 75 MCG tablet TAKE 1 TABLET BY MOUTH ONCE DAILY 90 tablet 1    hydroCHLOROthiazide (HYDRODIURIL) 25 MG tablet Take 1 tablet by mouth daily 90 tablet 1    omeprazole (PRILOSEC) 20 MG delayed release capsule Take 20 mg by mouth daily      Melatonin 5 MG CAPS Take 5 mg by mouth nightly 90 capsule 1    cycloSPORINE (RESTASIS) 0.05 % ophthalmic emulsion 1 drop 2 times daily      Calcium Carbonate-Vitamin

## 2021-03-16 LAB — GYNECOLOGY CYTOLOGY REPORT: NORMAL

## 2021-03-17 ENCOUNTER — TELEPHONE (OUTPATIENT)
Dept: FAMILY MEDICINE CLINIC | Age: 61
End: 2021-03-17

## 2021-03-19 NOTE — TELEPHONE ENCOUNTER
Patient called back and asked that we re-do the pap at her July 16th appt. Advised her we would check with Dr Lion Mcneill to make sure that is OK and if not call her back.

## 2021-07-16 ENCOUNTER — OFFICE VISIT (OUTPATIENT)
Dept: FAMILY MEDICINE CLINIC | Age: 61
End: 2021-07-16

## 2021-07-16 VITALS
HEART RATE: 76 BPM | RESPIRATION RATE: 16 BRPM | BODY MASS INDEX: 26.62 KG/M2 | HEIGHT: 61 IN | SYSTOLIC BLOOD PRESSURE: 138 MMHG | DIASTOLIC BLOOD PRESSURE: 82 MMHG | WEIGHT: 141 LBS | TEMPERATURE: 96.9 F

## 2021-07-16 DIAGNOSIS — Z90.710 PAP SMEAR OF VAGINA WITH PREVIOUS HYSTERECTOMY FOR CANCER: Primary | ICD-10-CM

## 2021-07-16 DIAGNOSIS — Z08 PAP SMEAR OF VAGINA WITH PREVIOUS HYSTERECTOMY FOR CANCER: Primary | ICD-10-CM

## 2021-07-16 DIAGNOSIS — I10 ESSENTIAL HYPERTENSION: ICD-10-CM

## 2021-07-16 DIAGNOSIS — E03.9 ACQUIRED HYPOTHYROIDISM: ICD-10-CM

## 2021-07-16 PROCEDURE — 99213 OFFICE O/P EST LOW 20 MIN: CPT | Performed by: FAMILY MEDICINE

## 2021-07-16 RX ORDER — LEVOTHYROXINE SODIUM 0.07 MG/1
TABLET ORAL
Qty: 90 TABLET | Refills: 1 | Status: SHIPPED | OUTPATIENT
Start: 2021-07-16 | End: 2021-11-19 | Stop reason: SDUPTHER

## 2021-07-16 RX ORDER — HYDROCHLOROTHIAZIDE 25 MG/1
25 TABLET ORAL DAILY
Qty: 90 TABLET | Refills: 1 | Status: SHIPPED | OUTPATIENT
Start: 2021-07-16 | End: 2021-11-19 | Stop reason: SDUPTHER

## 2021-07-16 SDOH — ECONOMIC STABILITY: FOOD INSECURITY: WITHIN THE PAST 12 MONTHS, YOU WORRIED THAT YOUR FOOD WOULD RUN OUT BEFORE YOU GOT MONEY TO BUY MORE.: NEVER TRUE

## 2021-07-16 SDOH — ECONOMIC STABILITY: FOOD INSECURITY: WITHIN THE PAST 12 MONTHS, THE FOOD YOU BOUGHT JUST DIDN'T LAST AND YOU DIDN'T HAVE MONEY TO GET MORE.: NEVER TRUE

## 2021-07-16 ASSESSMENT — ENCOUNTER SYMPTOMS
CHEST TIGHTNESS: 0
ABDOMINAL PAIN: 0
NAUSEA: 0
VOMITING: 0
COUGH: 0
SHORTNESS OF BREATH: 0
CONSTIPATION: 0
DIARRHEA: 0
EYES NEGATIVE: 1

## 2021-07-16 ASSESSMENT — SOCIAL DETERMINANTS OF HEALTH (SDOH): HOW HARD IS IT FOR YOU TO PAY FOR THE VERY BASICS LIKE FOOD, HOUSING, MEDICAL CARE, AND HEATING?: NOT HARD AT ALL

## 2021-07-16 NOTE — PROGRESS NOTES
Date: 7/16/2021    Rosio Melo is a 63 y.o. female who presents today for:  Chief Complaint   Patient presents with    Check-Up    Hypertension    Gynecologic Exam       HPI:     HPI    has a current medication list which includes the following prescription(s): hydrochlorothiazide, levothyroxine, omeprazole, melatonin, cyclosporine, calcium carbonate-vitamin d, aspirin, and multiple vitamin. Allergies   Allergen Reactions    Morphine Nausea And Vomiting       Social History     Tobacco Use    Smoking status: Never Smoker    Smokeless tobacco: Never Used   Substance Use Topics    Alcohol use: No    Drug use: No       Past Medical History:   Diagnosis Date    Anemia     Diverticulosis 10/10    Hypertension 5/06    Hypothyroidism 10/14/05    Mild carotid artery disease ([address] 7/06    Unspecified sleep apnea 10/05       Past Surgical History:   Procedure Laterality Date    BLADDER SUSPENSION      COLONOSCOPY  10/10 01/11    DILATION AND CURETTAGE OF UTERUS  1985/ 1988    HYSTERECTOMY  6/01    TUBAL LIGATION  1989       Family History   Problem Relation Age of Onset    Cancer Mother     Lung Cancer Mother     Heart Disease Father      Subjective:     Review of Systems   Constitutional: Negative for activity change, appetite change, diaphoresis, fatigue and fever. HENT: Negative. Eyes: Negative. Respiratory: Negative for cough, chest tightness and shortness of breath. Cardiovascular: Negative for chest pain, palpitations and leg swelling. Gastrointestinal: Negative for abdominal pain, constipation, diarrhea, nausea and vomiting. Genitourinary: Negative. Musculoskeletal: Negative. Skin: Negative. Negative for rash. Neurological: Negative for dizziness, syncope, weakness, light-headedness, numbness and headaches.    Psychiatric/Behavioral: Negative.        :   /82 (Site: Right Upper Arm, Position: Sitting, Cuff Size: Medium Adult)   Pulse 76   Temp 96.9 °F (36.1 °C) (Skin)   Resp 16   Ht 5' 1\" (1.549 m)   Wt 141 lb (64 kg)   BMI 26.64 kg/m²   Wt Readings from Last 3 Encounters:   07/16/21 141 lb (64 kg)   03/12/21 145 lb 6 oz (65.9 kg)   11/06/20 136 lb (61.7 kg)     Physical Exam  Vitals and nursing note reviewed. Exam conducted with a chaperone present (AD Madrigal). Constitutional:       General: She is not in acute distress. Appearance: She is well-developed. She is not diaphoretic. HENT:      Head: Normocephalic and atraumatic. Eyes:      General: No scleral icterus. Right eye: No discharge. Left eye: No discharge. Conjunctiva/sclera: Conjunctivae normal.      Pupils: Pupils are equal, round, and reactive to light. Neck:      Thyroid: No thyromegaly. Vascular: No JVD. Cardiovascular:      Rate and Rhythm: Normal rate and regular rhythm. Heart sounds: Normal heart sounds. No murmur heard. Pulmonary:      Effort: No respiratory distress. Breath sounds: Normal breath sounds. No wheezing, rhonchi or rales. Abdominal:      General: Bowel sounds are normal. There is no distension. Palpations: Abdomen is soft. There is no mass. Tenderness: There is no abdominal tenderness. There is no guarding or rebound. Genitourinary:     Pubic Area: No rash. Labia:         Right: No rash, tenderness or lesion. Left: No rash, tenderness or lesion. Vagina: Normal.      Uterus: Absent. Comments: Blind vaginal cuff  Pap smear done  Musculoskeletal:         General: Normal range of motion. Cervical back: Normal range of motion and neck supple. Lymphadenopathy:      Cervical: No cervical adenopathy. Skin:     General: Skin is warm and dry. Findings: No rash. Neurological:      Mental Status: She is alert and oriented to person, place, and time. Psychiatric:         Behavior: Behavior normal.       :       Diagnosis Orders   1.  Essential hypertension  hydroCHLOROthiazide (HYDRODIURIL) 25 MG tablet   2. Acquired hypothyroidism  levothyroxine (SYNTHROID) 75 MCG tablet       :      Requested Prescriptions     Signed Prescriptions Disp Refills    hydroCHLOROthiazide (HYDRODIURIL) 25 MG tablet 90 tablet 1     Sig: Take 1 tablet by mouth daily    levothyroxine (SYNTHROID) 75 MCG tablet 90 tablet 1     Sig: TAKE 1 TABLET BY MOUTH ONCE DAILY     Current Outpatient Medications   Medication Sig Dispense Refill    hydroCHLOROthiazide (HYDRODIURIL) 25 MG tablet Take 1 tablet by mouth daily 90 tablet 1    levothyroxine (SYNTHROID) 75 MCG tablet TAKE 1 TABLET BY MOUTH ONCE DAILY 90 tablet 1    omeprazole (PRILOSEC) 20 MG delayed release capsule Take 20 mg by mouth daily      Melatonin 5 MG CAPS Take 5 mg by mouth nightly 90 capsule 1    cycloSPORINE (RESTASIS) 0.05 % ophthalmic emulsion 1 drop 2 times daily      Calcium Carbonate-Vitamin D (CALCIUM + D) 600-200 MG-UNIT TABS   Take 1 tablet by mouth daily       ASPIRIN Take 325 mg by mouth daily.  Multiple Vitamin (MULTIVITAMINS PO)   Take 1 tablet by mouth daily        No current facility-administered medications for this visit. No orders of the defined types were placed in this encounter. Continue current medications. Return in about 4 months (around 11/16/2021) for HTN. Discussed use, benefit, and side effects of prescribed medications. All patient questions answered. Pt voiced understanding. Instructed to continue current medications,diet and exercise. Patient agreed with treatment plan.

## 2021-07-21 LAB — GYNECOLOGY CYTOLOGY REPORT: NORMAL

## 2021-07-26 ENCOUNTER — TELEPHONE (OUTPATIENT)
Dept: FAMILY MEDICINE CLINIC | Age: 61
End: 2021-07-26

## 2021-11-19 ENCOUNTER — OFFICE VISIT (OUTPATIENT)
Dept: FAMILY MEDICINE CLINIC | Age: 61
End: 2021-11-19

## 2021-11-19 VITALS
DIASTOLIC BLOOD PRESSURE: 88 MMHG | RESPIRATION RATE: 16 BRPM | HEIGHT: 61 IN | TEMPERATURE: 96.6 F | WEIGHT: 147 LBS | SYSTOLIC BLOOD PRESSURE: 142 MMHG | BODY MASS INDEX: 27.75 KG/M2 | HEART RATE: 80 BPM

## 2021-11-19 DIAGNOSIS — E03.9 ACQUIRED HYPOTHYROIDISM: ICD-10-CM

## 2021-11-19 DIAGNOSIS — I10 ESSENTIAL HYPERTENSION: ICD-10-CM

## 2021-11-19 DIAGNOSIS — K21.9 GASTROESOPHAGEAL REFLUX DISEASE, UNSPECIFIED WHETHER ESOPHAGITIS PRESENT: ICD-10-CM

## 2021-11-19 PROCEDURE — 99213 OFFICE O/P EST LOW 20 MIN: CPT | Performed by: FAMILY MEDICINE

## 2021-11-19 RX ORDER — LEVOTHYROXINE SODIUM 0.07 MG/1
TABLET ORAL
Qty: 90 TABLET | Refills: 1 | Status: SHIPPED | OUTPATIENT
Start: 2021-11-19 | End: 2022-01-26

## 2021-11-19 RX ORDER — HYDROCHLOROTHIAZIDE 25 MG/1
25 TABLET ORAL DAILY
Qty: 90 TABLET | Refills: 1 | Status: SHIPPED | OUTPATIENT
Start: 2021-11-19 | End: 2022-01-26

## 2021-11-19 ASSESSMENT — ENCOUNTER SYMPTOMS
ORTHOPNEA: 0
DIARRHEA: 0
SHORTNESS OF BREATH: 0
CHEST TIGHTNESS: 0
ABDOMINAL PAIN: 0
CONSTIPATION: 0
COUGH: 0
NAUSEA: 0
BLURRED VISION: 0
VOMITING: 0
EYES NEGATIVE: 1

## 2021-11-19 NOTE — PROGRESS NOTES
Date: 11/19/2021    George Cuevas is a 57 y.o. female who presents today for:  Chief Complaint   Patient presents with   [place] Check-Up she states that she is doing good. No new problems  Sad as she lost a great friend to COVID    Hypertension       HPI:     Hypertension  This is a chronic problem. The current episode started more than 1 year ago. The problem is unchanged. The problem is controlled. Pertinent negatives include no blurred vision, chest pain, headaches, neck pain, orthopnea, palpitations, PND, shortness of breath or sweats. Agents associated with hypertension include steroids. Risk factors for coronary artery disease include dyslipidemia and post-menopausal state. Past treatments include diuretics. The current treatment provides significant improvement. There are no compliance problems. has a current medication list which includes the following prescription(s): hydrochlorothiazide, levothyroxine, omeprazole, melatonin, cyclosporine, calcium carbonate-vitamin d, aspirin, and multiple vitamin. Allergies   Allergen Reactions    Morphine Nausea And Vomiting       Social History     Tobacco Use    Smoking status: Never Smoker    Smokeless tobacco: Never Used   Substance Use Topics    Alcohol use: No    Drug use: No       Past Medical History:   Diagnosis Date    Anemia     Diverticulosis 10/10    Hypertension 5/06    Hypothyroidism 10/14/05    Mild carotid artery disease ([address] 7/06    Unspecified sleep apnea 10/05       Past Surgical History:   Procedure Laterality Date    BLADDER SUSPENSION      COLONOSCOPY  10/10 01/11    DILATION AND CURETTAGE OF UTERUS  1985/ 1988    HYSTERECTOMY  6/01    TUBAL LIGATION  1989       Family History   Problem Relation Age of Onset    Cancer Mother     Lung Cancer Mother     Heart Disease Father      Subjective:     Review of Systems   Constitutional: Negative for activity change, appetite change, diaphoresis, fatigue and fever.    HENT: Negative. Eyes: Negative. Negative for blurred vision. Respiratory: Negative for cough, chest tightness and shortness of breath. Cardiovascular: Negative for chest pain, palpitations, orthopnea, leg swelling and PND. Gastrointestinal: Negative for abdominal pain, constipation, diarrhea, nausea and vomiting. Genitourinary: Negative. Musculoskeletal: Negative. Negative for neck pain. Skin: Negative. Negative for rash. Neurological: Negative for dizziness, syncope, weakness, light-headedness, numbness and headaches. Psychiatric/Behavioral: Negative.        :   BP (!) 142/88 (Site: Right Upper Arm, Position: Sitting, Cuff Size: Medium Adult)   Pulse 80   Temp 96.6 °F (35.9 °C) (Skin)   Resp 16   Ht 5' 1\" (1.549 m)   Wt 147 lb (66.7 kg)   BMI 27.78 kg/m²   Wt Readings from Last 3 Encounters:   11/19/21 147 lb (66.7 kg)   07/16/21 141 lb (64 kg)   03/12/21 145 lb 6 oz (65.9 kg)     Physical Exam  Vitals and nursing note reviewed. Constitutional:       General: She is not in acute distress. Appearance: She is well-developed. She is not diaphoretic. HENT:      Head: Normocephalic and atraumatic. Eyes:      General: No scleral icterus. Right eye: No discharge. Left eye: No discharge. Conjunctiva/sclera: Conjunctivae normal.      Pupils: Pupils are equal, round, and reactive to light. Neck:      Thyroid: No thyromegaly. Vascular: No JVD. Cardiovascular:      Rate and Rhythm: Normal rate and regular rhythm. Heart sounds: Normal heart sounds. No murmur heard. Pulmonary:      Effort: No respiratory distress. Breath sounds: Normal breath sounds. No wheezing, rhonchi or rales. Abdominal:      General: Bowel sounds are normal. There is no distension. Palpations: Abdomen is soft. There is no mass. Tenderness: There is no abdominal tenderness. There is no guarding or rebound. Musculoskeletal:         General: Normal range of motion. Cervical back: Normal range of motion and neck supple. Lymphadenopathy:      Cervical: No cervical adenopathy. Skin:     General: Skin is warm and dry. Findings: No rash. Neurological:      Mental Status: She is alert and oriented to person, place, and time. Psychiatric:         Behavior: Behavior normal.       :       Diagnosis Orders   1. Essential hypertension  hydroCHLOROthiazide (HYDRODIURIL) 25 MG tablet    CBC Auto Differential    Lipid Panel   2. Acquired hypothyroidism  levothyroxine (SYNTHROID) 75 MCG tablet    TSH without Reflex    T4, Free   3. Gastroesophageal reflux disease, unspecified whether esophagitis present         :      Requested Prescriptions     Signed Prescriptions Disp Refills    hydroCHLOROthiazide (HYDRODIURIL) 25 MG tablet 90 tablet 1     Sig: Take 1 tablet by mouth daily    levothyroxine (SYNTHROID) 75 MCG tablet 90 tablet 1     Sig: TAKE 1 TABLET BY MOUTH ONCE DAILY     Current Outpatient Medications   Medication Sig Dispense Refill    hydroCHLOROthiazide (HYDRODIURIL) 25 MG tablet Take 1 tablet by mouth daily 90 tablet 1    levothyroxine (SYNTHROID) 75 MCG tablet TAKE 1 TABLET BY MOUTH ONCE DAILY 90 tablet 1    omeprazole (PRILOSEC) 20 MG delayed release capsule Take 20 mg by mouth daily      Melatonin 5 MG CAPS Take 5 mg by mouth nightly 90 capsule 1    cycloSPORINE (RESTASIS) 0.05 % ophthalmic emulsion 1 drop 2 times daily      Calcium Carbonate-Vitamin D (CALCIUM + D) 600-200 MG-UNIT TABS   Take 1 tablet by mouth daily       ASPIRIN Take 325 mg by mouth daily.  Multiple Vitamin (MULTIVITAMINS PO)   Take 1 tablet by mouth daily        No current facility-administered medications for this visit.      Orders Placed This Encounter   Procedures    CBC Auto Differential     Standing Status:   Future     Standing Expiration Date:   11/19/2022    Lipid Panel     Standing Status:   Future     Standing Expiration Date:   11/19/2022     Order Specific Question:

## 2022-01-26 DIAGNOSIS — E03.9 ACQUIRED HYPOTHYROIDISM: ICD-10-CM

## 2022-01-26 DIAGNOSIS — I10 ESSENTIAL HYPERTENSION: ICD-10-CM

## 2022-01-26 RX ORDER — HYDROCHLOROTHIAZIDE 25 MG/1
TABLET ORAL
Qty: 90 TABLET | Refills: 0 | Status: SHIPPED | OUTPATIENT
Start: 2022-01-26 | End: 2022-04-01 | Stop reason: SDUPTHER

## 2022-01-26 RX ORDER — LEVOTHYROXINE SODIUM 0.07 MG/1
TABLET ORAL
Qty: 90 TABLET | Refills: 0 | Status: SHIPPED | OUTPATIENT
Start: 2022-01-26 | End: 2022-04-01 | Stop reason: SDUPTHER

## 2022-01-26 NOTE — TELEPHONE ENCOUNTER
Date of last visit:  11/19/2021  Date of next visit:  2/25/2022    Requested Prescriptions     Pending Prescriptions Disp Refills    levothyroxine (SYNTHROID) 75 MCG tablet [Pharmacy Med Name: Levothyroxine Sodium 75 MCG Oral Tablet] 90 tablet 0     Sig: Take 1 tablet by mouth once daily    hydroCHLOROthiazide (HYDRODIURIL) 25 MG tablet [Pharmacy Med Name: hydroCHLOROthiazide 25 MG Oral Tablet] 90 tablet 0     Sig: Take 1 tablet by mouth once daily

## 2022-02-10 LAB
BASOPHILS ABSOLUTE: 0.1 /ΜL
BASOPHILS RELATIVE PERCENT: 1 %
CHOLESTEROL, TOTAL: 180 MG/DL
CHOLESTEROL/HDL RATIO: NORMAL
EOSINOPHILS ABSOLUTE: 0.4 /ΜL
EOSINOPHILS RELATIVE PERCENT: 5 %
HCT VFR BLD CALC: 5 % (ref 36–46)
HDLC SERPL-MCNC: 50 MG/DL (ref 35–70)
HEMOGLOBIN: 12 G/DL (ref 12–16)
LDL CHOLESTEROL CALCULATED: 116 MG/DL (ref 0–160)
LYMPHOCYTES ABSOLUTE: 2.4 /ΜL
LYMPHOCYTES RELATIVE PERCENT: 30 %
MCH RBC QN AUTO: 92 PG
MCHC RBC AUTO-ENTMCNC: 28.5 G/DL
MCV RBC AUTO: 40.3 FL
MONOCYTES ABSOLUTE: 0.7 /ΜL
MONOCYTES RELATIVE PERCENT: 8 %
NEUTROPHILS ABSOLUTE: 4.4 /ΜL
NEUTROPHILS RELATIVE PERCENT: 56 %
NONHDLC SERPL-MCNC: 14 MG/DL
PDW BLD-RTO: 13.1 %
PLATELET # BLD: 465 K/ΜL
PMV BLD AUTO: ABNORMAL FL
RBC # BLD: 4.39 10^6/ΜL
T4 FREE: 1.37
TRIGL SERPL-MCNC: 74 MG/DL
TSH SERPL DL<=0.05 MIU/L-ACNC: 0.29 UIU/ML
VLDLC SERPL CALC-MCNC: NORMAL MG/DL
WBC # BLD: 8 10^3/ML

## 2022-02-22 DIAGNOSIS — I10 ESSENTIAL HYPERTENSION: ICD-10-CM

## 2022-02-22 DIAGNOSIS — E03.9 ACQUIRED HYPOTHYROIDISM: ICD-10-CM

## 2022-04-01 ENCOUNTER — OFFICE VISIT (OUTPATIENT)
Dept: FAMILY MEDICINE CLINIC | Age: 62
End: 2022-04-01

## 2022-04-01 VITALS
SYSTOLIC BLOOD PRESSURE: 130 MMHG | DIASTOLIC BLOOD PRESSURE: 80 MMHG | WEIGHT: 141.8 LBS | RESPIRATION RATE: 12 BRPM | HEIGHT: 61 IN | TEMPERATURE: 96.7 F | BODY MASS INDEX: 26.77 KG/M2 | HEART RATE: 76 BPM

## 2022-04-01 DIAGNOSIS — K21.9 GASTROESOPHAGEAL REFLUX DISEASE, UNSPECIFIED WHETHER ESOPHAGITIS PRESENT: ICD-10-CM

## 2022-04-01 DIAGNOSIS — E03.9 ACQUIRED HYPOTHYROIDISM: ICD-10-CM

## 2022-04-01 DIAGNOSIS — Z12.31 ENCOUNTER FOR SCREENING MAMMOGRAM FOR MALIGNANT NEOPLASM OF BREAST: ICD-10-CM

## 2022-04-01 DIAGNOSIS — I10 ESSENTIAL HYPERTENSION: Primary | ICD-10-CM

## 2022-04-01 DIAGNOSIS — D75.839 THROMBOCYTOSIS: ICD-10-CM

## 2022-04-01 LAB
ALBUMIN SERPL-MCNC: 4.3 G/DL
ALP BLD-CCNC: 101 U/L
ALT SERPL-CCNC: 13 U/L
ANION GAP SERPL CALCULATED.3IONS-SCNC: NORMAL MMOL/L
AST SERPL-CCNC: 18 U/L
BASOPHILS ABSOLUTE: 0.1 /ΜL
BASOPHILS RELATIVE PERCENT: 1 %
BILIRUB SERPL-MCNC: 0.3 MG/DL (ref 0.1–1.4)
BUN BLDV-MCNC: 14 MG/DL
CALCIUM SERPL-MCNC: 9.7 MG/DL
CHLORIDE BLD-SCNC: 102 MMOL/L
CO2: 26 MMOL/L
CREAT SERPL-MCNC: 0.86 MG/DL
EOSINOPHILS ABSOLUTE: 0.3 /ΜL
EOSINOPHILS RELATIVE PERCENT: 4 %
GFR CALCULATED: NORMAL
GLUCOSE BLD-MCNC: 115 MG/DL
HCT VFR BLD CALC: 39.4 % (ref 36–46)
HEMOGLOBIN: 12.7 G/DL (ref 12–16)
LYMPHOCYTES ABSOLUTE: 2.1 /ΜL
LYMPHOCYTES RELATIVE PERCENT: 25 %
MCH RBC QN AUTO: 28.3 PG
MCHC RBC AUTO-ENTMCNC: 32.2 G/DL
MCV RBC AUTO: 90 FL
MONOCYTES ABSOLUTE: 0.3 /ΜL
MONOCYTES RELATIVE PERCENT: 4 %
NEUTROPHILS ABSOLUTE: 5.4 /ΜL
NEUTROPHILS RELATIVE PERCENT: 65 %
PDW BLD-RTO: 13.1 %
PLATELET # BLD: 446 K/ΜL
PMV BLD AUTO: NORMAL FL
POTASSIUM SERPL-SCNC: 4.3 MMOL/L
RBC # BLD: 4.39 10^6/ΜL
SODIUM BLD-SCNC: 142 MMOL/L
TOTAL PROTEIN: 7.5
WBC # BLD: 8.3 10^3/ML

## 2022-04-01 PROCEDURE — 99213 OFFICE O/P EST LOW 20 MIN: CPT | Performed by: FAMILY MEDICINE

## 2022-04-01 RX ORDER — LEVOTHYROXINE SODIUM 0.07 MG/1
TABLET ORAL
Qty: 90 TABLET | Refills: 1 | Status: SHIPPED | OUTPATIENT
Start: 2022-04-01 | End: 2022-10-26

## 2022-04-01 RX ORDER — HYDROCHLOROTHIAZIDE 25 MG/1
TABLET ORAL
Qty: 90 TABLET | Refills: 1 | Status: SHIPPED | OUTPATIENT
Start: 2022-04-01 | End: 2022-10-26

## 2022-04-01 ASSESSMENT — ENCOUNTER SYMPTOMS
ABDOMINAL PAIN: 0
CHEST TIGHTNESS: 0
SHORTNESS OF BREATH: 0
VOMITING: 0
EYES NEGATIVE: 1
DIARRHEA: 0
NAUSEA: 0
ORTHOPNEA: 0
BLURRED VISION: 0
CONSTIPATION: 0
COUGH: 0

## 2022-04-01 ASSESSMENT — PATIENT HEALTH QUESTIONNAIRE - PHQ9
SUM OF ALL RESPONSES TO PHQ9 QUESTIONS 1 & 2: 0
SUM OF ALL RESPONSES TO PHQ QUESTIONS 1-9: 0
SUM OF ALL RESPONSES TO PHQ QUESTIONS 1-9: 0
2. FEELING DOWN, DEPRESSED OR HOPELESS: 0
1. LITTLE INTEREST OR PLEASURE IN DOING THINGS: 0
SUM OF ALL RESPONSES TO PHQ QUESTIONS 1-9: 0
SUM OF ALL RESPONSES TO PHQ QUESTIONS 1-9: 0

## 2022-04-01 NOTE — PROGRESS NOTES
Date: 4/1/2022    Pati Soto is a 57 y.o. female who presents today for:  Chief Complaint   Patient presents with    Hypertension stable    Hypothyroidism       HPI:     Hypertension  This is a chronic problem. The current episode started more than 1 year ago. The problem is unchanged. The problem is controlled. Pertinent negatives include no blurred vision, chest pain, headaches, neck pain, orthopnea, palpitations, PND or shortness of breath. Agents associated with hypertension include thyroid hormones. Risk factors for coronary artery disease include post-menopausal state. Past treatments include diuretics. The current treatment provides significant improvement. There are no compliance problems. has a current medication list which includes the following prescription(s): hydrochlorothiazide, levothyroxine, omeprazole, melatonin, cyclosporine, calcium carbonate-vitamin d, aspirin, and multiple vitamin. Allergies   Allergen Reactions    Morphine Nausea And Vomiting       Social History     Tobacco Use    Smoking status: Never Smoker    Smokeless tobacco: Never Used   Substance Use Topics    Alcohol use: No    Drug use: No       Past Medical History:   Diagnosis Date    Anemia     Diverticulosis 10/10    Hypertension 5/06    Hypothyroidism 10/14/05    Mild carotid artery disease ([facility] [address] 7/06    Unspecified sleep apnea 10/05       Past Surgical History:   Procedure Laterality Date    BLADDER SUSPENSION      COLONOSCOPY  10/10 01/11    DILATION AND CURETTAGE OF UTERUS  1985/ 1988    HYSTERECTOMY  6/01    TUBAL LIGATION  1989       Family History   Problem Relation Age of Onset    Cancer Mother     Lung Cancer Mother     Heart Disease Father      Subjective:     Review of Systems   Constitutional: Negative for activity change, appetite change, diaphoresis, fatigue and fever. HENT: Negative. Eyes: Negative. Negative for blurred vision.    Respiratory: Negative for cough, chest tightness and shortness of breath. Cardiovascular: Negative for chest pain, palpitations, orthopnea, leg swelling and PND. Gastrointestinal: Negative for abdominal pain, constipation, diarrhea, nausea and vomiting. Genitourinary: Negative. Musculoskeletal: Negative. Negative for neck pain. Skin: Negative. Negative for rash. Neurological: Negative for dizziness, syncope, weakness, light-headedness, numbness and headaches. Psychiatric/Behavioral: Negative.        :   /80 (Site: Right Upper Arm, Position: Sitting, Cuff Size: Medium Adult)   Pulse 76   Temp 96.7 °F (35.9 °C)   Resp 12   Ht 5' 1\" (1.549 m)   Wt 141 lb 12.8 oz (64.3 kg)   BMI 26.79 kg/m²   Wt Readings from Last 3 Encounters:   04/01/22 141 lb 12.8 oz (64.3 kg)   11/19/21 147 lb (66.7 kg)   07/16/21 141 lb (64 kg)     Physical Exam  Vitals and nursing note reviewed. Constitutional:       General: She is not in acute distress. Appearance: She is well-developed. She is not diaphoretic. HENT:      Head: Normocephalic and atraumatic. Eyes:      General: No scleral icterus. Right eye: No discharge. Left eye: No discharge. Conjunctiva/sclera: Conjunctivae normal.      Pupils: Pupils are equal, round, and reactive to light. Neck:      Thyroid: No thyromegaly. Vascular: No JVD. Cardiovascular:      Rate and Rhythm: Normal rate and regular rhythm. Heart sounds: Normal heart sounds. No murmur heard. Pulmonary:      Effort: No respiratory distress. Breath sounds: Normal breath sounds. No wheezing, rhonchi or rales. Abdominal:      General: Bowel sounds are normal. There is no distension. Palpations: Abdomen is soft. There is no mass. Tenderness: There is no abdominal tenderness. There is no guarding or rebound. Musculoskeletal:         General: Normal range of motion. Cervical back: Normal range of motion and neck supple.    Lymphadenopathy:      Cervical: No cervical adenopathy. Skin:     General: Skin is warm and dry. Findings: No rash. Neurological:      Mental Status: She is alert and oriented to person, place, and time. Psychiatric:         Behavior: Behavior normal.       :       Diagnosis Orders   1. Essential hypertension  hydroCHLOROthiazide (HYDRODIURIL) 25 MG tablet    Comprehensive Metabolic Panel   2. Acquired hypothyroidism  levothyroxine (SYNTHROID) 75 MCG tablet   3. Gastroesophageal reflux disease, unspecified whether esophagitis present     4. Thrombocytosis  CBC with Auto Differential   5. Encounter for screening mammogram for malignant neoplasm of breast  [facility] DIGITAL SCREEN W OR WO CAD BILATERAL       :      Requested Prescriptions     Signed Prescriptions Disp Refills    hydroCHLOROthiazide (HYDRODIURIL) 25 MG tablet 90 tablet 1     Sig: Take 1 tablet by mouth once daily    levothyroxine (SYNTHROID) 75 MCG tablet 90 tablet 1     Sig: Take 1 tablet by mouth once daily     Current Outpatient Medications   Medication Sig Dispense Refill    hydroCHLOROthiazide (HYDRODIURIL) 25 MG tablet Take 1 tablet by mouth once daily 90 tablet 1    levothyroxine (SYNTHROID) 75 MCG tablet Take 1 tablet by mouth once daily 90 tablet 1    omeprazole (PRILOSEC) 20 MG delayed release capsule Take 20 mg by mouth daily      Melatonin 5 MG CAPS Take 5 mg by mouth nightly 90 capsule 1    cycloSPORINE (RESTASIS) 0.05 % ophthalmic emulsion 1 drop 2 times daily      Calcium Carbonate-Vitamin D (CALCIUM + D) 600-200 MG-UNIT TABS   Take 1 tablet by mouth daily       ASPIRIN Take 325 mg by mouth daily.  Multiple Vitamin (MULTIVITAMINS PO)   Take 1 tablet by mouth daily        No current facility-administered medications for this visit.      Orders Placed This Encounter   Procedures    [facility] DIGITAL SCREEN W OR WO CAD BILATERAL     Standing Status:   Future     Standing Expiration Date:   4/1/2023    CBC with Auto Differential     Standing Status:   Future Standing Expiration Date:   4/1/2023    Comprehensive Metabolic Panel     Standing Status:   Future     Standing Expiration Date:   4/1/2023       Continue current medications. Return in about 4 months (around 8/1/2022) for HTN. Discussed use, benefit, and side effects of prescribed medications. All patient questions answered. Pt voiced understanding. Instructed to continue current medications,diet and exercise. Patient agreed with treatment plan.      Results for orders placed or performed in visit on 02/22/22   CBC Auto Differential   Result Value Ref Range    WBC 8.0 10^3/mL    RBC 4.39 10^6/µL    Hemoglobin 12 12.0 - 16.0 g/dL    Hematocrit 5 (A) 36 - 46 %    MCV 40.3 fL    MCH 92 pg    MCHC 28.5 g/dL    Platelets 717 K/µL    RDW 13.1 %    MPV      Neutrophils % 56 %    Lymphocytes % 30 %    Monocytes % 8 %    Eosinophils % 5 %    Basophils % 1 %    Neutrophils Absolute 4.4 /µL    Lymphocytes Absolute 2.4 /µL    Monocytes Absolute 0.7 /µL    Eosinophils Absolute 0.4 /µL    Basophils Absolute 0.1 /µL   Lipid Panel   Result Value Ref Range    Cholesterol, Total 180 mg/dL    HDL 50 35 - 70 mg/dL    LDL Calculated 116 0 - 160 mg/dL    Triglycerides 74 mg/dL    Chol/HDL Ratio      VLDL      Cholesterol non HDL 14    T4, Free   Result Value Ref Range    T4 Free 1.37    TSH without Reflex   Result Value Ref Range    TSH 0.292 uIU/mL

## 2022-04-04 DIAGNOSIS — I10 ESSENTIAL HYPERTENSION: ICD-10-CM

## 2022-04-04 DIAGNOSIS — D75.839 THROMBOCYTOSIS: ICD-10-CM

## 2022-05-14 ENCOUNTER — HOSPITAL ENCOUNTER (EMERGENCY)
Age: 62
Discharge: HOME | End: 2022-05-14
Payer: COMMERCIAL

## 2022-05-14 VITALS
OXYGEN SATURATION: 96 % | HEART RATE: 94 BPM | BODY MASS INDEX: 24.94 KG/M2 | TEMPERATURE: 98.9 F | RESPIRATION RATE: 18 BRPM | DIASTOLIC BLOOD PRESSURE: 63 MMHG | SYSTOLIC BLOOD PRESSURE: 122 MMHG | WEIGHT: 132 LBS

## 2022-05-14 DIAGNOSIS — J06.9 UPPER RESPIRATORY TRACT INFECTION, UNSPECIFIED TYPE: Primary | ICD-10-CM

## 2022-05-14 PROCEDURE — 99202 OFFICE O/P NEW SF 15 MIN: CPT | Performed by: NURSE PRACTITIONER

## 2022-05-14 PROCEDURE — 99202 OFFICE O/P NEW SF 15 MIN: CPT

## 2022-05-14 RX ORDER — DOXYCYCLINE HYCLATE 100 MG
100 TABLET ORAL 2 TIMES DAILY
Qty: 14 TABLET | Refills: 0 | Status: SHIPPED | OUTPATIENT
Start: 2022-05-14 | End: 2022-05-21

## 2022-05-14 ASSESSMENT — ENCOUNTER SYMPTOMS
DIARRHEA: 0
SORE THROAT: 1
COUGH: 1
SINUS PAIN: 1
ABDOMINAL PAIN: 0
SINUS PRESSURE: 1
RHINORRHEA: 1
NAUSEA: 0
EYE REDNESS: 0
SHORTNESS OF BREATH: 0
CHEST TIGHTNESS: 0
EYE ITCHING: 0
VOMITING: 0

## 2022-05-14 ASSESSMENT — PAIN - FUNCTIONAL ASSESSMENT: PAIN_FUNCTIONAL_ASSESSMENT: NONE - DENIES PAIN

## 2022-05-14 NOTE — ED TRIAGE NOTES
Patient to room with c/o nonproductive cough, sore throat, and post nasal drainage beginning two days ago.

## 2022-05-14 NOTE — ED PROVIDER NOTES
CONTINUE these medications which have NOT CHANGED    Details   hydroCHLOROthiazide (HYDRODIURIL) 25 MG tablet Take 1 tablet by mouth once daily, Disp-90 tablet, R-1Normal      levothyroxine (SYNTHROID) 75 MCG tablet Take 1 tablet by mouth once daily, Disp-90 tablet, R-1Normal      omeprazole (PRILOSEC) 20 MG delayed release capsule Take 20 mg by mouth dailyHistorical Med      Melatonin 5 MG CAPS Take 5 mg by mouth nightly, Disp-90 capsule, R-1Normal      cycloSPORINE (RESTASIS) 0.05 % ophthalmic emulsion 1 drop 2 times dailyHistorical Med      Calcium Carbonate-Vitamin D (CALCIUM + D) 600-200 MG-UNIT TABS   Take 1 tablet by mouth daily       ASPIRIN Take 325 mg by mouth daily. Multiple Vitamin (MULTIVITAMINS PO)   Take 1 tablet by mouth daily              ALLERGIES     Patient is is allergic to morphine. FAMILY HISTORY     Patient'sfamily history includes Cancer in her mother; Heart Disease in her father; Lalo Members in her mother. SOCIAL HISTORY     Patient  reports that she has never smoked. She has never used smokeless tobacco. She reports that she does not drink alcohol and does not use drugs. PHYSICAL EXAM     ED TRIAGE VITALS  BP: 122/63, Temp: 98.9 °F (37.2 °C), Pulse: 94, Resp: 18, SpO2: 96 %  Physical Exam  Vitals and nursing note reviewed. Constitutional:       General: She is not in acute distress. Appearance: Normal appearance. She is well-developed and well-groomed. HENT:      Head: Normocephalic and atraumatic. Right Ear: Tympanic membrane, ear canal and external ear normal.      Left Ear: Tympanic membrane, ear canal and external ear normal.      Nose: Mucosal edema and congestion present. Mouth/Throat:      Lips: Pink. Mouth: Mucous membranes are moist.      Pharynx: Uvula midline. Posterior oropharyngeal erythema present. Eyes:      Conjunctiva/sclera: Conjunctivae normal.      Right eye: Right conjunctiva is not injected.       Left eye: Left conjunctiva is not injected. Pupils: Pupils are equal.   Cardiovascular:      Rate and Rhythm: Normal rate. Heart sounds: Normal heart sounds. Pulmonary:      Effort: Pulmonary effort is normal. No respiratory distress. Breath sounds: Normal breath sounds. Musculoskeletal:      Cervical back: Normal range of motion. Skin:     General: Skin is warm and dry. Findings: No rash (on exposed surfaces). Neurological:      Mental Status: She is alert and oriented to person, place, and time. Gait: Gait is intact. Psychiatric:         Mood and Affect: Mood normal.         Speech: Speech normal.         Behavior: Behavior is cooperative. DIAGNOSTIC RESULTS   Labs:  Abnormal Labs Reviewed - No data to display     IMAGING:  No orders to display     URGENT CARE COURSE:     Vitals:    05/14/22 0903   BP: 122/63   Pulse: 94   Resp: 18   Temp: 98.9 °F (37.2 °C)   TempSrc: Temporal   SpO2: 96%   Weight: 132 lb (59.9 kg)       Medications - No data to display  PROCEDURES:  FINALIMPRESSION      1. Upper respiratory tract infection, unspecified type        DISPOSITION/PLAN   DISPOSITION Decision To Discharge 05/14/2022 09:22:07 AM         Problem List Items Addressed This Visit     None      Visit Diagnoses     Upper respiratory tract infection, unspecified type    -  Primary    Relevant Medications    doxycycline hyclate (VIBRA-TABS) 100 MG tablet          Physical assessment findings, diagnostic testing(s) if applicable, and vital signs reviewed with patient/patient representative. Differential diagnosis(s) discussed with patient/patient representative. Prescription medications and/or over-the-counter medications for symptom management discussed. Patient is to follow-up with family care provider in 2-3 days if no improvement. If symptoms should worsen or change, go to the ED. Patient/patient representative is aware of care plan, questions answered, verbalizes understanding and is in agreement.    Printed instructions attached to after visit summary. If COVID-19 positive or COVID-19 by PCR is pending at time of discharge patient is to quarantine/isolate according to [facility] guidelines. PATIENT REFERRED TO:  Earnest Spencer MD  [address]  [phone]    Schedule an appointment as soon as possible for a visit in 3 days  For further evaluation. , If symptoms change/worsen, go [facility] the [address] [facility]    Please note that some or all of this chart was generated using [employer] voice recognition software. Although every effort was made to ensure the accuracy of this automated transcription, some errors in transcription may have occurred.         PATY Devine CNP  05/14/22 1121

## 2022-08-12 ENCOUNTER — OFFICE VISIT (OUTPATIENT)
Dept: FAMILY MEDICINE CLINIC | Age: 62
End: 2022-08-12

## 2022-08-12 VITALS
DIASTOLIC BLOOD PRESSURE: 72 MMHG | RESPIRATION RATE: 12 BRPM | HEART RATE: 76 BPM | BODY MASS INDEX: 24.62 KG/M2 | SYSTOLIC BLOOD PRESSURE: 132 MMHG | WEIGHT: 130.4 LBS | HEIGHT: 61 IN

## 2022-08-12 DIAGNOSIS — R73.9 HYPERGLYCEMIA: ICD-10-CM

## 2022-08-12 DIAGNOSIS — E03.9 ACQUIRED HYPOTHYROIDISM: ICD-10-CM

## 2022-08-12 DIAGNOSIS — I10 ESSENTIAL HYPERTENSION: Primary | ICD-10-CM

## 2022-08-12 LAB
CHP ED QC CHECK: ABNORMAL
GLUCOSE BLD-MCNC: 107 MG/DL
HBA1C MFR BLD: 5.8 %

## 2022-08-12 PROCEDURE — 83036 HEMOGLOBIN GLYCOSYLATED A1C: CPT | Performed by: FAMILY MEDICINE

## 2022-08-12 PROCEDURE — 82962 GLUCOSE BLOOD TEST: CPT | Performed by: FAMILY MEDICINE

## 2022-08-12 PROCEDURE — 99213 OFFICE O/P EST LOW 20 MIN: CPT | Performed by: FAMILY MEDICINE

## 2022-08-12 SDOH — ECONOMIC STABILITY: FOOD INSECURITY: WITHIN THE PAST 12 MONTHS, YOU WORRIED THAT YOUR FOOD WOULD RUN OUT BEFORE YOU GOT MONEY TO BUY MORE.: NEVER TRUE

## 2022-08-12 SDOH — ECONOMIC STABILITY: FOOD INSECURITY: WITHIN THE PAST 12 MONTHS, THE FOOD YOU BOUGHT JUST DIDN'T LAST AND YOU DIDN'T HAVE MONEY TO GET MORE.: NEVER TRUE

## 2022-08-12 ASSESSMENT — ENCOUNTER SYMPTOMS
EYES NEGATIVE: 1
NAUSEA: 0
SHORTNESS OF BREATH: 0
COUGH: 0
ORTHOPNEA: 0
BLURRED VISION: 0
DIARRHEA: 0
CHEST TIGHTNESS: 0
ABDOMINAL PAIN: 0
VOMITING: 0
CONSTIPATION: 0

## 2022-08-12 ASSESSMENT — SOCIAL DETERMINANTS OF HEALTH (SDOH): HOW HARD IS IT FOR YOU TO PAY FOR THE VERY BASICS LIKE FOOD, HOUSING, MEDICAL CARE, AND HEATING?: NOT HARD AT ALL

## 2022-08-12 NOTE — PROGRESS NOTES
Date: 8/12/2022    John Rudd is a 57 y.o. female who presents today for:  Chief Complaint   Patient presents with    Hypertension stable    Sleep Apnea  Blood sugars mildly elevated on her last blood work. HPI:     Hypertension  This is a chronic problem. The current episode started more than 1 year ago. The problem is unchanged. The problem is controlled. Pertinent negatives include no blurred vision, chest pain, headaches, neck pain, orthopnea, palpitations, peripheral edema, PND or shortness of breath. Risk factors for coronary artery disease include post-menopausal state. Past treatments include diuretics. The current treatment provides significant improvement. There are no compliance problems. has a current medication list which includes the following prescription(s): hydrochlorothiazide, levothyroxine, omeprazole, melatonin, cyclosporine, calcium carbonate-vitamin d, aspirin, and multiple vitamin. Allergies   Allergen Reactions    Morphine Nausea And Vomiting       Social History     Tobacco Use    Smoking status: Never    Smokeless tobacco: Never   Substance Use Topics    Alcohol use: No    Drug use: No       Past Medical History:   Diagnosis Date    Anemia     Diverticulosis 10/10    Hypertension 5/06    Hypothyroidism 10/14/05    Mild carotid artery disease ([address] 7/06    Unspecified sleep apnea 10/05       Past Surgical History:   Procedure Laterality Date    BLADDER SUSPENSION      COLONOSCOPY  10/10 01/11    DILATION AND CURETTAGE OF UTERUS  1985/ 1988    HYSTERECTOMY (CERVIX STATUS UNKNOWN)  6/01    TUBAL LIGATION  1989       Family History   Problem Relation Age of Onset    Cancer Mother     Lung Cancer Mother     Heart Disease Father      Subjective:     Review of Systems   Constitutional:  Negative for activity change, appetite change, diaphoresis, fatigue and fever. HENT: Negative. Eyes: Negative. Negative for blurred vision.    Respiratory:  Negative for cough, chest tightness and shortness of breath. Cardiovascular:  Negative for chest pain, palpitations, orthopnea, leg swelling and PND. Gastrointestinal:  Negative for abdominal pain, constipation, diarrhea, nausea and vomiting. Genitourinary: Negative. Musculoskeletal: Negative. Negative for neck pain. Skin: Negative. Negative for rash. Neurological:  Negative for dizziness, syncope, weakness, light-headedness, numbness and headaches. Psychiatric/Behavioral: Negative. She is not snacking and eating early at night. She is biking in the afternoons.   :   /72 (Site: Right Upper Arm, Position: Sitting, Cuff Size: Medium Adult)   Pulse 76   Resp 12   Ht 5' 1\" (1.549 m)   Wt 130 lb 6.4 oz (59.1 kg)   BMI 24.64 kg/m²   Wt Readings from Last 3 Encounters:   08/12/22 130 lb 6.4 oz (59.1 kg)   05/14/22 132 lb (59.9 kg)   04/01/22 141 lb 12.8 oz (64.3 kg)     Physical Exam  Vitals and nursing note reviewed. Constitutional:       General: She is not in acute distress. Appearance: She is well-developed. She is not diaphoretic. HENT:      Head: Normocephalic and atraumatic. Eyes:      General: No scleral icterus. Right eye: No discharge. Left eye: No discharge. Conjunctiva/sclera: Conjunctivae normal.      Pupils: Pupils are equal, round, and reactive to light. Neck:      Thyroid: No thyromegaly. Vascular: No JVD. Cardiovascular:      Rate and Rhythm: Normal rate and regular rhythm. Heart sounds: Normal heart sounds. No murmur heard. Pulmonary:      Effort: No respiratory distress. Breath sounds: Normal breath sounds. No wheezing, rhonchi or rales. Abdominal:      General: Bowel sounds are normal. There is no distension. Palpations: Abdomen is soft. There is no mass. Tenderness: There is no abdominal tenderness. There is no guarding or rebound. Musculoskeletal:         General: Normal range of motion.       Cervical back: Normal range of motion

## 2022-10-26 DIAGNOSIS — E03.9 ACQUIRED HYPOTHYROIDISM: ICD-10-CM

## 2022-10-26 DIAGNOSIS — I10 ESSENTIAL HYPERTENSION: ICD-10-CM

## 2022-10-26 RX ORDER — HYDROCHLOROTHIAZIDE 25 MG/1
TABLET ORAL
Qty: 90 TABLET | Refills: 1 | Status: SHIPPED | OUTPATIENT
Start: 2022-10-26

## 2022-10-26 RX ORDER — LEVOTHYROXINE SODIUM 0.07 MG/1
TABLET ORAL
Qty: 90 TABLET | Refills: 0 | Status: SHIPPED | OUTPATIENT
Start: 2022-10-26

## 2022-10-26 NOTE — TELEPHONE ENCOUNTER
The pharmacy is  requesting a refill of the below medication which has been pended for you:     Requested Prescriptions     Pending Prescriptions Disp Refills    levothyroxine (SYNTHROID) 75 MCG tablet [Pharmacy Med Name: Levothyroxine Sodium 75 MCG Oral Tablet] 90 tablet 1     Sig: Take 1 tablet by mouth once daily    hydroCHLOROthiazide (HYDRODIURIL) 25 MG tablet [Pharmacy Med Name: hydroCHLOROthiazide 25 MG Oral Tablet] 90 tablet 1     Sig: Take 1 tablet by mouth once daily       Last Appointment Date: 8/12/2022  Next Appointment Date: 12/16/2022    Allergies   Allergen Reactions    Morphine Nausea And Vomiting

## 2022-12-15 ASSESSMENT — ENCOUNTER SYMPTOMS
BLURRED VISION: 0
ABDOMINAL PAIN: 0
SHORTNESS OF BREATH: 0
ORTHOPNEA: 0
DIARRHEA: 0
CHEST TIGHTNESS: 0
CONSTIPATION: 0
NAUSEA: 0
COUGH: 0
VOMITING: 0
EYES NEGATIVE: 1

## 2022-12-15 NOTE — PROGRESS NOTES
Date: 12/16/2022    Jenae Blackmon is a 60 y.o. female who presents today for:  Chief Complaint   Patient presents with    Hypertension stable    Hypothyroidism  She is doing good no new problems. HPI:     Hypertension  This is a chronic problem. The current episode started more than 1 year ago. The problem is unchanged. The problem is controlled. Pertinent negatives include no anxiety, blurred vision, chest pain, headaches, malaise/fatigue, neck pain, orthopnea, palpitations, peripheral edema, PND or shortness of breath. Risk factors for coronary artery disease include post-menopausal state. Past treatments include diuretics. The current treatment provides significant improvement. There are no compliance problems. Gastroesophageal Reflux  She reports no abdominal pain, no chest pain, no coughing, no early satiety, no globus sensation or no nausea. This is a chronic problem. The problem occurs rarely. The problem has been unchanged. Pertinent negatives include no fatigue, orthopnea or weight loss. She has tried a PPI for the symptoms. The treatment provided significant relief. has a current medication list which includes the following prescription(s): levothyroxine, hydrochlorothiazide, omeprazole, melatonin, cyclosporine, calcium carbonate-vitamin d, aspirin, and multiple vitamin.     Allergies   Allergen Reactions    Morphine Nausea And Vomiting       Social History     Tobacco Use    Smoking status: Never    Smokeless tobacco: Never   Substance Use Topics    Alcohol use: No    Drug use: No       Past Medical History:   Diagnosis Date    Anemia     Diverticulosis 10/10    Hypertension 5/06    Hypothyroidism 10/14/05    Mild carotid artery disease ([facility] [address] 7/06    Unspecified sleep apnea 10/05       Past Surgical History:   Procedure Laterality Date    BLADDER SUSPENSION      COLONOSCOPY  10/10 01/11    DILATION AND CURETTAGE OF UTERUS  1985/ 1988    HYSTERECTOMY (CERVIX STATUS UNKNOWN)  6/01 TUBAL LIGATION  1989       Family History   Problem Relation Age of Onset    Cancer Mother     Lung Cancer Mother     Heart Disease Father      Subjective:     Review of Systems   Constitutional:  Negative for activity change, appetite change, diaphoresis, fatigue, fever, malaise/fatigue and weight loss. HENT: Negative. Eyes: Negative. Negative for blurred vision. Respiratory:  Negative for cough, chest tightness and shortness of breath. Cardiovascular:  Negative for chest pain, palpitations, orthopnea, leg swelling and PND. Gastrointestinal:  Negative for abdominal pain, constipation, diarrhea, nausea and vomiting. Genitourinary: Negative. Musculoskeletal: Negative. Negative for neck pain. Skin: Negative. Negative for rash. Neurological:  Negative for dizziness, syncope, weakness, light-headedness, numbness and headaches. Psychiatric/Behavioral: Negative.       :   /80 (Site: Right Upper Arm, Position: Sitting, Cuff Size: Medium Adult)   Pulse 72   Resp 12   Ht 5' 1\" (1.549 m)   Wt 136 lb (61.7 kg)   BMI 25.70 kg/m²   Wt Readings from Last 3 Encounters:   12/16/22 136 lb (61.7 kg)   08/12/22 130 lb 6.4 oz (59.1 kg)   05/14/22 132 lb (59.9 kg)     Physical Exam  Vitals and nursing note reviewed. Constitutional:       General: She is not in acute distress. Appearance: She is well-developed. She is not diaphoretic. HENT:      Head: Normocephalic and atraumatic. Eyes:      General: No scleral icterus. Right eye: No discharge. Left eye: No discharge. Conjunctiva/sclera: Conjunctivae normal.      Pupils: Pupils are equal, round, and reactive to light. Neck:      Thyroid: No thyromegaly. Vascular: No JVD. Cardiovascular:      Rate and Rhythm: Normal rate and regular rhythm. Heart sounds: Normal heart sounds. No murmur heard. Pulmonary:      Effort: No respiratory distress. Breath sounds: Normal breath sounds.  No wheezing, rhonchi or rales.   Abdominal:      General: Bowel sounds are normal. There is no distension. Palpations: Abdomen is soft. There is no mass. Tenderness: There is no abdominal tenderness. There is no guarding or rebound. Musculoskeletal:         General: Normal range of motion. Cervical back: Normal range of motion and neck supple. Lymphadenopathy:      Cervical: No cervical adenopathy. Skin:     General: Skin is warm and dry. Findings: No rash. Neurological:      Mental Status: She is alert and oriented to person, place, and time. Psychiatric:         Behavior: Behavior normal.     :       Diagnosis Orders   1. Essential hypertension  Lipid Panel    Comprehensive Metabolic Panel    CBC with Auto Differential      2. Acquired hypothyroidism  TSH    T4, Free          :      Requested Prescriptions      No prescriptions requested or ordered in this encounter     Current Outpatient Medications   Medication Sig Dispense Refill    levothyroxine (SYNTHROID) 75 MCG tablet Take 1 tablet by mouth once daily 90 tablet 0    hydroCHLOROthiazide (HYDRODIURIL) 25 MG tablet Take 1 tablet by mouth once daily 90 tablet 1    omeprazole (PRILOSEC) 20 MG delayed release capsule Take 20 mg by mouth daily      Melatonin 5 MG CAPS Take 5 mg by mouth nightly (Patient taking differently: Take 5 mg by mouth nightly as needed) 90 capsule 1    cycloSPORINE (RESTASIS) 0.05 % ophthalmic emulsion 1 drop 2 times daily      calcium carbonate-vitamin D 600-200 MG-UNIT TABS   Take 1 tablet by mouth daily       ASPIRIN Take 325 mg by mouth daily. Multiple Vitamin (MULTIVITAMINS PO)   Take 1 tablet by mouth daily        No current facility-administered medications for this visit.      Orders Placed This Encounter   Procedures    TSH     Standing Status:   Future     Standing Expiration Date:   12/16/2023    T4, Free     Standing Status:   Future     Standing Expiration Date:   12/16/2023    Lipid Panel     Standing Status: Future     Standing Expiration Date:   12/16/2023     Order Specific Question:   Is Patient Fasting?/# of Hours     Answer:   yes 12 hours    Comprehensive Metabolic Panel     Standing Status:   Future     Standing Expiration Date:   12/16/2023    CBC with Auto Differential     Standing Status:   Future     Standing Expiration Date:   12/16/2023     Continue current medications. Return in about 4 months (around 4/16/2023), or if symptoms worsen or fail to improve, for HTN. Discussed use, benefit, and side effects of prescribed medications. All patient questions answered. Pt voiced understanding. Instructed to continue current medications,diet and exercise. Patient agreed with treatment plan. Allergies, Problem List, Medications, Medical History, Family History, Surgical History and Tobacco History reviewed by provider.

## 2022-12-16 ENCOUNTER — OFFICE VISIT (OUTPATIENT)
Dept: FAMILY MEDICINE CLINIC | Age: 62
End: 2022-12-16

## 2022-12-16 VITALS
SYSTOLIC BLOOD PRESSURE: 132 MMHG | HEIGHT: 61 IN | BODY MASS INDEX: 25.68 KG/M2 | HEART RATE: 72 BPM | WEIGHT: 136 LBS | RESPIRATION RATE: 12 BRPM | DIASTOLIC BLOOD PRESSURE: 80 MMHG

## 2022-12-16 DIAGNOSIS — I10 ESSENTIAL HYPERTENSION: Primary | ICD-10-CM

## 2022-12-16 DIAGNOSIS — E03.9 ACQUIRED HYPOTHYROIDISM: ICD-10-CM

## 2022-12-16 ASSESSMENT — ENCOUNTER SYMPTOMS: GLOBUS SENSATION: 0

## 2023-01-14 DIAGNOSIS — E03.9 ACQUIRED HYPOTHYROIDISM: ICD-10-CM

## 2023-01-16 RX ORDER — LEVOTHYROXINE SODIUM 0.07 MG/1
TABLET ORAL
Qty: 90 TABLET | Refills: 0 | Status: SHIPPED | OUTPATIENT
Start: 2023-01-16

## 2023-01-16 NOTE — TELEPHONE ENCOUNTER
Date of last visit:  12/16/2022  Date of next visit:  4/21/2023    Requested Prescriptions     Pending Prescriptions Disp Refills    levothyroxine (SYNTHROID) 75 MCG tablet [Pharmacy Med Name: Levothyroxine Sodium 75 MCG Oral Tablet] 90 tablet 0     Sig: Take 1 tablet by mouth once daily

## 2023-02-13 ENCOUNTER — TELEPHONE (OUTPATIENT)
Dept: FAMILY MEDICINE CLINIC | Age: 63
End: 2023-02-13

## 2023-02-13 DIAGNOSIS — E78.5 HYPERLIPIDEMIA, UNSPECIFIED HYPERLIPIDEMIA TYPE: Primary | ICD-10-CM

## 2023-02-13 NOTE — TELEPHONE ENCOUNTER
----- Message from Carly Del Rosario MD sent at 2/10/2023  4:00 PM EST -----  Please let her know that her cholesterol is higher than previous blood work. Any changes in diet or exercise level?

## 2023-04-21 ENCOUNTER — OFFICE VISIT (OUTPATIENT)
Dept: FAMILY MEDICINE CLINIC | Age: 63
End: 2023-04-21

## 2023-04-21 VITALS
HEART RATE: 80 BPM | HEIGHT: 61 IN | RESPIRATION RATE: 12 BRPM | WEIGHT: 141.8 LBS | BODY MASS INDEX: 26.77 KG/M2 | SYSTOLIC BLOOD PRESSURE: 136 MMHG | DIASTOLIC BLOOD PRESSURE: 88 MMHG

## 2023-04-21 DIAGNOSIS — E03.9 ACQUIRED HYPOTHYROIDISM: ICD-10-CM

## 2023-04-21 DIAGNOSIS — E78.5 HYPERLIPIDEMIA, UNSPECIFIED HYPERLIPIDEMIA TYPE: ICD-10-CM

## 2023-04-21 DIAGNOSIS — I10 ESSENTIAL HYPERTENSION: Primary | ICD-10-CM

## 2023-04-21 RX ORDER — LEVOTHYROXINE SODIUM 0.07 MG/1
75 TABLET ORAL DAILY
Qty: 90 TABLET | Refills: 1 | Status: SHIPPED | OUTPATIENT
Start: 2023-04-21

## 2023-04-21 SDOH — ECONOMIC STABILITY: FOOD INSECURITY: WITHIN THE PAST 12 MONTHS, THE FOOD YOU BOUGHT JUST DIDN'T LAST AND YOU DIDN'T HAVE MONEY TO GET MORE.: NEVER TRUE

## 2023-04-21 SDOH — ECONOMIC STABILITY: INCOME INSECURITY: HOW HARD IS IT FOR YOU TO PAY FOR THE VERY BASICS LIKE FOOD, HOUSING, MEDICAL CARE, AND HEATING?: NOT HARD AT ALL

## 2023-04-21 SDOH — ECONOMIC STABILITY: HOUSING INSECURITY
IN THE LAST 12 MONTHS, WAS THERE A TIME WHEN YOU DID NOT HAVE A STEADY PLACE TO SLEEP OR SLEPT IN A SHELTER (INCLUDING NOW)?: NO

## 2023-04-21 SDOH — ECONOMIC STABILITY: FOOD INSECURITY: WITHIN THE PAST 12 MONTHS, YOU WORRIED THAT YOUR FOOD WOULD RUN OUT BEFORE YOU GOT MONEY TO BUY MORE.: NEVER TRUE

## 2023-04-21 ASSESSMENT — PATIENT HEALTH QUESTIONNAIRE - PHQ9
SUM OF ALL RESPONSES TO PHQ QUESTIONS 1-9: 0
6. FEELING BAD ABOUT YOURSELF - OR THAT YOU ARE A FAILURE OR HAVE LET YOURSELF OR YOUR FAMILY DOWN: 0
9. THOUGHTS THAT YOU WOULD BE BETTER OFF DEAD, OR OF HURTING YOURSELF: 0
4. FEELING TIRED OR HAVING LITTLE ENERGY: 0
7. TROUBLE CONCENTRATING ON THINGS, SUCH AS READING THE NEWSPAPER OR WATCHING TELEVISION: 0
2. FEELING DOWN, DEPRESSED OR HOPELESS: 0
3. TROUBLE FALLING OR STAYING ASLEEP: 0
SUM OF ALL RESPONSES TO PHQ QUESTIONS 1-9: 0
SUM OF ALL RESPONSES TO PHQ QUESTIONS 1-9: 0
1. LITTLE INTEREST OR PLEASURE IN DOING THINGS: 0
8. MOVING OR SPEAKING SO SLOWLY THAT OTHER PEOPLE COULD HAVE NOTICED. OR THE OPPOSITE, BEING SO FIGETY OR RESTLESS THAT YOU HAVE BEEN MOVING AROUND A LOT MORE THAN USUAL: 0
5. POOR APPETITE OR OVEREATING: 0
SUM OF ALL RESPONSES TO PHQ9 QUESTIONS 1 & 2: 0
SUM OF ALL RESPONSES TO PHQ QUESTIONS 1-9: 0
10. IF YOU CHECKED OFF ANY PROBLEMS, HOW DIFFICULT HAVE THESE PROBLEMS MADE IT FOR YOU TO DO YOUR WORK, TAKE CARE OF THINGS AT HOME, OR GET ALONG WITH OTHER PEOPLE: 0

## 2023-04-21 ASSESSMENT — ENCOUNTER SYMPTOMS
ABDOMINAL PAIN: 0
DIARRHEA: 0
EYES NEGATIVE: 1
COUGH: 0
CHEST TIGHTNESS: 0
CONSTIPATION: 0
VOMITING: 0
NAUSEA: 0

## 2023-04-21 NOTE — PROGRESS NOTES
swelling and PND. Gastrointestinal:  Negative for abdominal pain, constipation, diarrhea, nausea and vomiting. Genitourinary: Negative. Musculoskeletal: Negative. Negative for neck pain. Skin: Negative. Negative for rash. Neurological:  Negative for dizziness, syncope, weakness, light-headedness, numbness and headaches. Psychiatric/Behavioral: Negative.       :   /88 (Site: Right Upper Arm, Position: Sitting, Cuff Size: Medium Adult)   Pulse 80   Resp 12   Ht 5' 1\" (1.549 m)   Wt 141 lb 12.8 oz (64.3 kg)   BMI 26.79 kg/m²   Wt Readings from Last 3 Encounters:   04/21/23 141 lb 12.8 oz (64.3 kg)   12/16/22 136 lb (61.7 kg)   08/12/22 130 lb 6.4 oz (59.1 kg)     Physical Exam  Vitals and nursing note reviewed. Constitutional:       General: She is not in acute distress. Appearance: She is well-developed. She is not diaphoretic. HENT:      Head: Normocephalic and atraumatic. Eyes:      General: No scleral icterus. Right eye: No discharge. Left eye: No discharge. Conjunctiva/sclera: Conjunctivae normal.      Pupils: Pupils are equal, round, and reactive to light. Neck:      Thyroid: No thyromegaly. Vascular: No JVD. Cardiovascular:      Rate and Rhythm: Normal rate and regular rhythm. Heart sounds: Normal heart sounds. No murmur heard. Pulmonary:      Effort: No respiratory distress. Breath sounds: Normal breath sounds. No wheezing, rhonchi or rales. Abdominal:      General: Bowel sounds are normal. There is no distension. Palpations: Abdomen is soft. There is no mass. Tenderness: There is no abdominal tenderness. There is no guarding or rebound. Musculoskeletal:         General: Normal range of motion. Cervical back: Normal range of motion and neck supple. Lymphadenopathy:      Cervical: No cervical adenopathy. Skin:     General: Skin is warm and dry. Findings: No rash.    Neurological:      Mental Status: She is

## 2023-05-23 ENCOUNTER — HOSPITAL ENCOUNTER (EMERGENCY)
Age: 63
Discharge: HOME | End: 2023-05-23
Payer: COMMERCIAL

## 2023-05-23 VITALS
HEART RATE: 134 BPM | RESPIRATION RATE: 16 BRPM | SYSTOLIC BLOOD PRESSURE: 150 MMHG | BODY MASS INDEX: 27.38 KG/M2 | OXYGEN SATURATION: 96 % | WEIGHT: 145 LBS | DIASTOLIC BLOOD PRESSURE: 78 MMHG | HEIGHT: 61 IN | TEMPERATURE: 99.8 F

## 2023-05-23 DIAGNOSIS — J06.9 VIRAL UPPER RESPIRATORY TRACT INFECTION: Primary | ICD-10-CM

## 2023-05-23 LAB — SARS-COV-2 RDRP RESP QL NAA+PROBE: NOT  DETECTED

## 2023-05-23 PROCEDURE — 87635 SARS-COV-2 COVID-19 AMP PRB: CPT

## 2023-05-23 PROCEDURE — 99213 OFFICE O/P EST LOW 20 MIN: CPT

## 2023-05-23 PROCEDURE — 99213 OFFICE O/P EST LOW 20 MIN: CPT | Performed by: NURSE PRACTITIONER

## 2023-05-23 RX ORDER — IBUPROFEN 600 MG/1
600 TABLET ORAL 4 TIMES DAILY PRN
Qty: 80 TABLET | Refills: 0 | Status: SHIPPED | OUTPATIENT
Start: 2023-05-23

## 2023-05-23 ASSESSMENT — ENCOUNTER SYMPTOMS
RHINORRHEA: 0
ABDOMINAL PAIN: 0
WHEEZING: 0
DIARRHEA: 0
NAUSEA: 0
COUGH: 1
SHORTNESS OF BREATH: 0
BLOOD IN STOOL: 0
VOMITING: 0
EYE PAIN: 0
CONSTIPATION: 0

## 2023-05-23 ASSESSMENT — PAIN - FUNCTIONAL ASSESSMENT: PAIN_FUNCTIONAL_ASSESSMENT: NONE - DENIES PAIN

## 2023-05-23 NOTE — ED PROVIDER NOTES
[id number]       Chief Complaint   Patient presents with    Chills    Generalized Body Aches    Cough    Fever        Nurses Notes reviewed and I agree except as noted in the HPI. HISTORY OF PRESENT ILLNESS   Laura Courtney is a 60 y.o. female who presents to urgent care with complaint of fever, chills, fatigue, cough and generalized body aches. Patient denies taking medications for symptoms. Patient denies other symptoms including chest pain, shortness breath, nausea, vomiting or diarrhea    REVIEW OF SYSTEMS     Review of Systems   Constitutional:  Positive for chills, fatigue and fever. Negative for appetite change and unexpected weight change. HENT:  Negative for ear pain and rhinorrhea. Eyes:  Negative for pain and visual disturbance. Respiratory:  Positive for cough. Negative for shortness of breath and wheezing. Cardiovascular:  Negative for chest pain, palpitations and leg swelling. Gastrointestinal:  Negative for abdominal pain, blood in stool, constipation, diarrhea, nausea and vomiting. Genitourinary:  Negative for dysuria, frequency and hematuria. Musculoskeletal:  Positive for myalgias. Negative for arthralgias, joint swelling and neck stiffness. Skin:  Negative for rash. Neurological:  Negative for dizziness, syncope, weakness, light-headedness and headaches. Hematological:  Does not bruise/bleed easily. PAST MEDICAL HISTORY         Diagnosis Date    Anemia     Diverticulosis 10/10    Hypertension 5/06    Hypothyroidism 10/14/05    Mild carotid artery disease ([facility] [address] 7/06    Unspecified sleep apnea 10/05       SURGICAL HISTORY     Patient  has a past surgical history that includes Dilation and curettage of uterus (1985/ 1988); Tubal ligation (1989); Hysterectomy (6/01); Colonoscopy (10/10 01/11); and bladder suspension.     CURRENT MEDICATIONS       Previous Medications    ASPIRIN    Take 325 mg by mouth

## 2023-05-23 NOTE — ED NOTES
Patient understood instructions verbally,  Follow up with PCP with any concerns, e-script, Worse UTI symptoms follow up with ED.ambulated self to lobby,stable condition. All belongings with patient.       Minoo Perez LPN  02/35/45 [id number]

## 2023-05-23 NOTE — ED TRIAGE NOTES
Pt to urgent care due to fatigue and body aches with chills. New onset of symptoms started on Saturday.

## 2023-06-08 DIAGNOSIS — I10 ESSENTIAL HYPERTENSION: ICD-10-CM

## 2023-06-08 NOTE — TELEPHONE ENCOUNTER
Date of last visit:  4/21/2023  Date of next visit:  9/8/2023    Requested Prescriptions     Pending Prescriptions Disp Refills    hydroCHLOROthiazide (HYDRODIURIL) 25 MG tablet [Pharmacy Med Name: hydroCHLOROthiazide 25 MG Oral Tablet] 90 tablet 0     Sig: Take 1 tablet by mouth once daily

## 2023-06-09 LAB — LDL CHOLESTEROL DIRECT: 121 MG/DL

## 2023-06-09 RX ORDER — HYDROCHLOROTHIAZIDE 25 MG/1
TABLET ORAL
Qty: 90 TABLET | Refills: 0 | Status: SHIPPED | OUTPATIENT
Start: 2023-06-09

## 2023-06-12 DIAGNOSIS — E78.5 HYPERLIPIDEMIA, UNSPECIFIED HYPERLIPIDEMIA TYPE: ICD-10-CM

## 2023-08-20 DIAGNOSIS — E03.9 ACQUIRED HYPOTHYROIDISM: ICD-10-CM

## 2023-08-21 RX ORDER — LEVOTHYROXINE SODIUM 0.07 MG/1
TABLET ORAL
Qty: 90 TABLET | Refills: 1 | Status: SHIPPED | OUTPATIENT
Start: 2023-08-21

## 2023-08-21 NOTE — TELEPHONE ENCOUNTER
Date of last visit:  4/21/2023  Date of next visit:  9/8/2023    Requested Prescriptions     Pending Prescriptions Disp Refills    levothyroxine (SYNTHROID) 75 MCG tablet [Pharmacy Med Name: Levothyroxine Sodium 75 MCG Oral Tablet] 90 tablet 0     Sig: Take 1 tablet by mouth once daily

## 2023-09-18 DIAGNOSIS — I10 ESSENTIAL HYPERTENSION: ICD-10-CM

## 2023-09-18 RX ORDER — HYDROCHLOROTHIAZIDE 25 MG/1
TABLET ORAL
Qty: 90 TABLET | Refills: 1 | Status: SHIPPED | OUTPATIENT
Start: 2023-09-18

## 2023-09-18 NOTE — TELEPHONE ENCOUNTER
The pharmacy is requesting a refill of the below medication which has been pended for you:     Requested Prescriptions     Pending Prescriptions Disp Refills    hydroCHLOROthiazide (HYDRODIURIL) 25 MG tablet [Pharmacy Med Name: hydroCHLOROthiazide 25 MG Oral Tablet] 90 tablet 1     Sig: Take 1 tablet by mouth once daily       Last Appointment Date: 4/21/2023  Next Appointment Date: 10/27/2023    Allergies   Allergen Reactions    Morphine Nausea And Vomiting     Per verbal order Dr Ciro Molina sent Rx to pharmacy.

## 2023-11-03 ENCOUNTER — OFFICE VISIT (OUTPATIENT)
Dept: FAMILY MEDICINE CLINIC | Age: 63
End: 2023-11-03

## 2023-11-03 VITALS
HEIGHT: 61 IN | BODY MASS INDEX: 27.75 KG/M2 | WEIGHT: 147 LBS | HEART RATE: 72 BPM | SYSTOLIC BLOOD PRESSURE: 130 MMHG | RESPIRATION RATE: 16 BRPM | DIASTOLIC BLOOD PRESSURE: 80 MMHG

## 2023-11-03 DIAGNOSIS — E03.9 ACQUIRED HYPOTHYROIDISM: ICD-10-CM

## 2023-11-03 DIAGNOSIS — K21.9 GASTROESOPHAGEAL REFLUX DISEASE, UNSPECIFIED WHETHER ESOPHAGITIS PRESENT: ICD-10-CM

## 2023-11-03 DIAGNOSIS — E78.5 HYPERLIPIDEMIA, UNSPECIFIED HYPERLIPIDEMIA TYPE: ICD-10-CM

## 2023-11-03 DIAGNOSIS — I10 ESSENTIAL HYPERTENSION: Primary | ICD-10-CM

## 2023-11-03 ASSESSMENT — ENCOUNTER SYMPTOMS
ABDOMINAL PAIN: 0
EYES NEGATIVE: 1
DIARRHEA: 0
NAUSEA: 0
CHEST TIGHTNESS: 0
VOMITING: 0
SHORTNESS OF BREATH: 0
COUGH: 0
CONSTIPATION: 0

## 2023-11-03 NOTE — PROGRESS NOTES
Date: 11/3/2023    Leobardo Abdul is a 58 y.o. female who presents today for:  Chief Complaint   Patient presents with    Hypothyroidism    Hypertension stable  She is doing good no concerns. HPI:     Hypertension  This is a chronic problem. The current episode started more than 1 year ago. The problem is unchanged. The problem is controlled. Pertinent negatives include no chest pain, headaches, neck pain, palpitations, peripheral edema, PND or shortness of breath. Risk factors for coronary artery disease include post-menopausal state. Past treatments include diuretics. The current treatment provides significant improvement. There are no compliance problems. has a current medication list which includes the following prescription(s): hydrochlorothiazide, levothyroxine, omeprazole, cyclosporine, calcium carbonate-vitamin d, aspirin, and multiple vitamin. Allergies   Allergen Reactions    Morphine Nausea And Vomiting       Social History     Tobacco Use    Smoking status: Never    Smokeless tobacco: Never   Substance Use Topics    Alcohol use: No    Drug use: No       Past Medical History:   Diagnosis Date    Anemia     Diverticulosis 10/10    Hypertension 5/06    Hypothyroidism 10/14/05    Mild carotid artery disease ([address]) 7/06    Unspecified sleep apnea 10/05       Past Surgical History:   Procedure Laterality Date    BLADDER SUSPENSION      COLONOSCOPY  10/10 01/11    DILATION AND CURETTAGE OF UTERUS  1985/ 1988    HYSTERECTOMY (CERVIX STATUS UNKNOWN)  6/01    TUBAL LIGATION  1989       Family History   Problem Relation Age of Onset    Cancer Mother     Lung Cancer Mother     Heart Disease Father      Subjective:     Review of Systems   Constitutional:  Negative for activity change, appetite change, diaphoresis, fatigue and fever. HENT: Negative. Eyes: Negative. Respiratory:  Negative for cough, chest tightness and shortness of breath.     Cardiovascular:  Negative for chest pain,

## 2024-03-02 DIAGNOSIS — I10 ESSENTIAL HYPERTENSION: ICD-10-CM

## 2024-03-04 RX ORDER — HYDROCHLOROTHIAZIDE 25 MG/1
TABLET ORAL
Qty: 90 TABLET | Refills: 0 | Status: SHIPPED | OUTPATIENT
Start: 2024-03-04

## 2024-03-04 NOTE — TELEPHONE ENCOUNTER
The pharmacy is  requesting a refill of the below medication which has been pended for you:     Requested Prescriptions     Pending Prescriptions Disp Refills    hydroCHLOROthiazide (HYDRODIURIL) 25 MG tablet [Pharmacy Med Name: hydroCHLOROthiazide 25 MG Oral Tablet] 90 tablet 0     Sig: Take 1 tablet by mouth once daily       Last Appointment Date: 11/3/2023  Next Appointment Date: 4/12/2024    Allergies   Allergen Reactions    Morphine Nausea And Vomiting

## 2024-04-10 ASSESSMENT — PATIENT HEALTH QUESTIONNAIRE - PHQ9
1. LITTLE INTEREST OR PLEASURE IN DOING THINGS: NOT AT ALL
SUM OF ALL RESPONSES TO PHQ9 QUESTIONS 1 & 2: 0
SUM OF ALL RESPONSES TO PHQ QUESTIONS 1-9: 0
2. FEELING DOWN, DEPRESSED OR HOPELESS: NOT AT ALL
1. LITTLE INTEREST OR PLEASURE IN DOING THINGS: NOT AT ALL
SUM OF ALL RESPONSES TO PHQ QUESTIONS 1-9: 0
SUM OF ALL RESPONSES TO PHQ QUESTIONS 1-9: 0
2. FEELING DOWN, DEPRESSED OR HOPELESS: NOT AT ALL
SUM OF ALL RESPONSES TO PHQ9 QUESTIONS 1 & 2: 0
SUM OF ALL RESPONSES TO PHQ QUESTIONS 1-9: 0

## 2024-04-11 LAB
ALBUMIN SERPL-MCNC: 4.1 G/DL
ALP BLD-CCNC: 116 U/L
ALT SERPL-CCNC: 18 U/L
ANION GAP SERPL CALCULATED.3IONS-SCNC: NORMAL MMOL/L
AST SERPL-CCNC: 20 U/L
BASOPHILS ABSOLUTE: 0.1 /ΜL
BASOPHILS RELATIVE PERCENT: 1 %
BILIRUB SERPL-MCNC: 0.4 MG/DL (ref 0.1–1.4)
BUN BLDV-MCNC: 17 MG/DL
CALCIUM SERPL-MCNC: 9.4 MG/DL
CHLORIDE BLD-SCNC: 103 MMOL/L
CHOLESTEROL, TOTAL: 187 MG/DL
CHOLESTEROL/HDL RATIO: NORMAL
CO2: 23 MMOL/L
CREAT SERPL-MCNC: 0.74 MG/DL
EGFR: 90
EOSINOPHILS ABSOLUTE: 0.5 /ΜL
EOSINOPHILS RELATIVE PERCENT: 6 %
GLUCOSE BLD-MCNC: 99 MG/DL
HCT VFR BLD CALC: 40.2 % (ref 36–46)
HDLC SERPL-MCNC: 53 MG/DL (ref 35–70)
HEMOGLOBIN: 12.7 G/DL (ref 12–16)
LDL CHOLESTEROL CALCULATED: 119 MG/DL (ref 0–160)
LYMPHOCYTES ABSOLUTE: 2.6 /ΜL
LYMPHOCYTES RELATIVE PERCENT: 31 %
MAGNESIUM: 2 MG/DL
MCH RBC QN AUTO: 29.4 PG
MCHC RBC AUTO-ENTMCNC: 31.6 G/DL
MCV RBC AUTO: 93 FL
MONOCYTES ABSOLUTE: 0.6 /ΜL
MONOCYTES RELATIVE PERCENT: 7 %
NEUTROPHILS ABSOLUTE: 4.6 /ΜL
NEUTROPHILS RELATIVE PERCENT: 55 %
NONHDLC SERPL-MCNC: NORMAL MG/DL
PDW BLD-RTO: 13.4 %
PLATELET # BLD: 437 K/ΜL
PMV BLD AUTO: NORMAL FL
POTASSIUM SERPL-SCNC: 4.5 MMOL/L
RBC # BLD: 4.32 10^6/ΜL
SODIUM BLD-SCNC: 143 MMOL/L
TOTAL PROTEIN: 7.1
TRIGL SERPL-MCNC: 82 MG/DL
TSH SERPL DL<=0.05 MIU/L-ACNC: 0.86 UIU/ML
VITAMIN B-12: 414
VLDLC SERPL CALC-MCNC: 15 MG/DL
WBC # BLD: 8.4 10^3/ML

## 2024-04-12 ENCOUNTER — OFFICE VISIT (OUTPATIENT)
Dept: FAMILY MEDICINE CLINIC | Age: 64
End: 2024-04-12

## 2024-04-12 VITALS
HEIGHT: 61 IN | WEIGHT: 149 LBS | SYSTOLIC BLOOD PRESSURE: 132 MMHG | BODY MASS INDEX: 28.13 KG/M2 | RESPIRATION RATE: 14 BRPM | DIASTOLIC BLOOD PRESSURE: 72 MMHG | HEART RATE: 76 BPM

## 2024-04-12 DIAGNOSIS — E78.5 HYPERLIPIDEMIA, UNSPECIFIED HYPERLIPIDEMIA TYPE: ICD-10-CM

## 2024-04-12 DIAGNOSIS — I10 ESSENTIAL HYPERTENSION: ICD-10-CM

## 2024-04-12 DIAGNOSIS — K21.9 GASTROESOPHAGEAL REFLUX DISEASE, UNSPECIFIED WHETHER ESOPHAGITIS PRESENT: ICD-10-CM

## 2024-04-12 DIAGNOSIS — E03.9 ACQUIRED HYPOTHYROIDISM: ICD-10-CM

## 2024-04-12 DIAGNOSIS — I10 ESSENTIAL HYPERTENSION: Primary | ICD-10-CM

## 2024-04-12 ASSESSMENT — ENCOUNTER SYMPTOMS
CHEST TIGHTNESS: 0
COUGH: 0
DIARRHEA: 0
EYES NEGATIVE: 1
CHOKING: 0
NAUSEA: 0
ABDOMINAL PAIN: 0
WHEEZING: 0
CONSTIPATION: 0
HOARSE VOICE: 0
VOMITING: 0
SHORTNESS OF BREATH: 0
HEARTBURN: 1

## 2024-04-12 NOTE — PROGRESS NOTES
medications,diet and exercise.  Patient agreed with treatment plan.     Allergies, Problem List, Medications, Medical History, Family History, Surgical History and Tobacco History reviewed by provider.

## 2024-04-26 DIAGNOSIS — E03.9 ACQUIRED HYPOTHYROIDISM: ICD-10-CM

## 2024-04-26 RX ORDER — LEVOTHYROXINE SODIUM 0.07 MG/1
TABLET ORAL
Qty: 90 TABLET | Refills: 1 | Status: SHIPPED | OUTPATIENT
Start: 2024-04-26

## 2024-04-26 NOTE — TELEPHONE ENCOUNTER
The pharmacy is  requesting a refill of the below medication which has been pended for you:     Requested Prescriptions     Pending Prescriptions Disp Refills    levothyroxine (SYNTHROID) 75 MCG tablet [Pharmacy Med Name: Levothyroxine Sodium 75 MCG Oral Tablet] 90 tablet 1     Sig: Take 1 tablet by mouth once daily       Last Appointment Date: 4/12/2024  Next Appointment Date: 8/23/2024    Allergies   Allergen Reactions    Morphine Nausea And Vomiting

## 2024-06-09 DIAGNOSIS — I10 ESSENTIAL HYPERTENSION: ICD-10-CM

## 2024-06-10 RX ORDER — HYDROCHLOROTHIAZIDE 25 MG/1
TABLET ORAL
Qty: 90 TABLET | Refills: 0 | Status: SHIPPED | OUTPATIENT
Start: 2024-06-10

## 2024-06-10 NOTE — TELEPHONE ENCOUNTER
The pharmacy is  requesting a refill of the below medication which has been pended for you:     Requested Prescriptions     Pending Prescriptions Disp Refills    hydroCHLOROthiazide (HYDRODIURIL) 25 MG tablet [Pharmacy Med Name: hydroCHLOROthiazide 25 MG Oral Tablet] 90 tablet 1     Sig: Take 1 tablet by mouth once daily       Last Appointment Date: 4/12/2024  Next Appointment Date: 8/23/2024    Allergies   Allergen Reactions    Morphine Nausea And Vomiting

## 2024-08-23 ENCOUNTER — OFFICE VISIT (OUTPATIENT)
Dept: FAMILY MEDICINE CLINIC | Age: 64
End: 2024-08-23

## 2024-08-23 VITALS
RESPIRATION RATE: 16 BRPM | DIASTOLIC BLOOD PRESSURE: 86 MMHG | HEIGHT: 64 IN | WEIGHT: 150.6 LBS | HEART RATE: 84 BPM | BODY MASS INDEX: 25.71 KG/M2 | SYSTOLIC BLOOD PRESSURE: 138 MMHG

## 2024-08-23 DIAGNOSIS — Z00.00 WELCOME TO MEDICARE PREVENTIVE VISIT: Primary | ICD-10-CM

## 2024-08-23 DIAGNOSIS — K21.9 GASTROESOPHAGEAL REFLUX DISEASE, UNSPECIFIED WHETHER ESOPHAGITIS PRESENT: ICD-10-CM

## 2024-08-23 DIAGNOSIS — E03.9 ACQUIRED HYPOTHYROIDISM: ICD-10-CM

## 2024-08-23 DIAGNOSIS — I10 ESSENTIAL HYPERTENSION: ICD-10-CM

## 2024-08-23 DIAGNOSIS — E78.5 HYPERLIPIDEMIA, UNSPECIFIED HYPERLIPIDEMIA TYPE: ICD-10-CM

## 2024-08-23 DIAGNOSIS — Z12.31 ENCOUNTER FOR SCREENING MAMMOGRAM FOR MALIGNANT NEOPLASM OF BREAST: ICD-10-CM

## 2024-08-23 SDOH — ECONOMIC STABILITY: FOOD INSECURITY: WITHIN THE PAST 12 MONTHS, THE FOOD YOU BOUGHT JUST DIDN'T LAST AND YOU DIDN'T HAVE MONEY TO GET MORE.: NEVER TRUE

## 2024-08-23 SDOH — ECONOMIC STABILITY: FOOD INSECURITY: WITHIN THE PAST 12 MONTHS, YOU WORRIED THAT YOUR FOOD WOULD RUN OUT BEFORE YOU GOT MONEY TO BUY MORE.: NEVER TRUE

## 2024-08-23 SDOH — ECONOMIC STABILITY: INCOME INSECURITY: HOW HARD IS IT FOR YOU TO PAY FOR THE VERY BASICS LIKE FOOD, HOUSING, MEDICAL CARE, AND HEATING?: NOT HARD AT ALL

## 2024-08-23 ASSESSMENT — ENCOUNTER SYMPTOMS
CONSTIPATION: 0
CHEST TIGHTNESS: 0
VOMITING: 0
ORTHOPNEA: 0
CHOKING: 0
SHORTNESS OF BREATH: 0
BLURRED VISION: 0
HEARTBURN: 0
STRIDOR: 0
ABDOMINAL PAIN: 0
WHEEZING: 0
NAUSEA: 0
DIARRHEA: 0
COUGH: 0
WATER BRASH: 0
EYES NEGATIVE: 1
BELCHING: 0
SORE THROAT: 0

## 2024-08-23 ASSESSMENT — PATIENT HEALTH QUESTIONNAIRE - PHQ9
SUM OF ALL RESPONSES TO PHQ9 QUESTIONS 1 & 2: 0
1. LITTLE INTEREST OR PLEASURE IN DOING THINGS: NOT AT ALL
SUM OF ALL RESPONSES TO PHQ QUESTIONS 1-9: 0
2. FEELING DOWN, DEPRESSED OR HOPELESS: NOT AT ALL
SUM OF ALL RESPONSES TO PHQ QUESTIONS 1-9: 0

## 2024-08-23 ASSESSMENT — LIFESTYLE VARIABLES
HOW MANY STANDARD DRINKS CONTAINING ALCOHOL DO YOU HAVE ON A TYPICAL DAY: PATIENT DOES NOT DRINK
HOW OFTEN DO YOU HAVE A DRINK CONTAINING ALCOHOL: NEVER

## 2024-08-23 NOTE — PROGRESS NOTES
Date: 8/23/2024    Deborah Barba is a 63 y.o. female who presents today for:  Chief Complaint   Patient presents with    Medicare AWV    Hypertension    Hypothyroidism  She states that she is doing good, she is eating a lot of fresh fruits and veggies.        HPI:     Hypertension  This is a chronic problem. The current episode started more than 1 year ago. The problem is unchanged. The problem is controlled. Pertinent negatives include no blurred vision, chest pain, headaches, neck pain, orthopnea, palpitations, peripheral edema, PND or shortness of breath. Risk factors for coronary artery disease include post-menopausal state. Past treatments include diuretics. The current treatment provides significant improvement. There are no compliance problems.    Gastroesophageal Reflux  She reports no abdominal pain, no belching, no chest pain, no choking, no coughing, no heartburn, no nausea, no sore throat, no stridor, no water brash or no wheezing. This is a chronic problem. The current episode started more than 1 year ago. The problem occurs rarely. The problem has been unchanged. Pertinent negatives include no fatigue. She has tried a PPI and a diet change for the symptoms. The treatment provided significant relief.       has a current medication list which includes the following prescription(s): hydrochlorothiazide, levothyroxine, omeprazole, cyclosporine, calcium carbonate-vitamin d, aspirin, and multiple vitamin.    Allergies   Allergen Reactions    Morphine Nausea And Vomiting       Social History     Tobacco Use    Smoking status: Never    Smokeless tobacco: Never   Substance Use Topics    Alcohol use: No    Drug use: No       Past Medical History:   Diagnosis Date    Anemia     Diverticulosis 10/10    Hypertension 5/06    Hypothyroidism 10/14/05    Mild carotid artery disease (HCC) 7/06    Unspecified sleep apnea 10/05       Past Surgical History:   Procedure Laterality Date    BLADDER SUSPENSION

## 2024-08-23 NOTE — PATIENT INSTRUCTIONS
your chances of quitting for good. Quitting is one of the most important things you can do to protect your heart. It is never too late to quit. Try to avoid secondhand smoke too.     Stay at a weight that's healthy for you. Talk to your doctor if you need help losing weight.     Try to get 7 to 9 hours of sleep each night.     Limit alcohol to 2 drinks a day for men and 1 drink a day for women. Too much alcohol can cause health problems.     Manage other health problems such as diabetes, high blood pressure, and high cholesterol. If you think you may have a problem with alcohol or drug use, talk to your doctor.   Medicines    Take your medicines exactly as prescribed. Call your doctor if you think you are having a problem with your medicine.     If your doctor recommends aspirin, take the amount directed each day. Make sure you take aspirin and not another kind of pain reliever, such as acetaminophen (Tylenol).   When should you call for help?   Call 911 if you have symptoms of a heart attack. These may include:    Chest pain or pressure, or a strange feeling in the chest.     Sweating.     Shortness of breath.     Pain, pressure, or a strange feeling in the back, neck, jaw, or upper belly or in one or both shoulders or arms.     Lightheadedness or sudden weakness.     A fast or irregular heartbeat.   After you call 911, the  may tell you to chew 1 adult-strength or 2 to 4 low-dose aspirin. Wait for an ambulance. Do not try to drive yourself.  Watch closely for changes in your health, and be sure to contact your doctor if you have any problems.  Where can you learn more?  Go to https://www.[employer].net/patientEd and enter F075 to learn more about \"A Healthy Heart: Care Instructions.\"  Current as of: June 24, 2023  Content Version: 14.1  © 9618-7926 [employer].   Care instructions adapted under license by [employer]. If you have questions about a medical condition or this instruction, always

## 2024-09-09 DIAGNOSIS — E03.9 ACQUIRED HYPOTHYROIDISM: ICD-10-CM

## 2024-09-09 DIAGNOSIS — I10 ESSENTIAL HYPERTENSION: ICD-10-CM

## 2024-09-09 RX ORDER — LEVOTHYROXINE SODIUM 75 UG/1
75 TABLET ORAL DAILY
Qty: 90 TABLET | Refills: 1 | Status: SHIPPED | OUTPATIENT
Start: 2024-09-09

## 2024-09-13 RX ORDER — HYDROCHLOROTHIAZIDE 25 MG/1
TABLET ORAL
Qty: 30 TABLET | Refills: 0 | Status: SHIPPED | OUTPATIENT
Start: 2024-09-13

## 2024-10-08 DIAGNOSIS — I10 ESSENTIAL HYPERTENSION: ICD-10-CM

## 2024-10-08 RX ORDER — HYDROCHLOROTHIAZIDE 25 MG/1
TABLET ORAL
Qty: 30 TABLET | Refills: 0 | Status: SHIPPED | OUTPATIENT
Start: 2024-10-08

## 2024-10-08 NOTE — TELEPHONE ENCOUNTER
Date of last visit:  Visit date not found  Date of next visit:  1/3/2025    Requested Prescriptions     Pending Prescriptions Disp Refills    hydroCHLOROthiazide (HYDRODIURIL) 25 MG tablet [Pharmacy Med Name: hydroCHLOROthiazide Oral Tablet 25 MG] 30 tablet 0     Sig: TAKE 1 TABLET BY MOUTH EVERY DAY

## 2024-10-28 DIAGNOSIS — E03.9 ACQUIRED HYPOTHYROIDISM: ICD-10-CM

## 2024-10-28 NOTE — TELEPHONE ENCOUNTER
Date of last visit:  8/23/2024  Date of next visit:  1/3/2025    Requested Prescriptions     Pending Prescriptions Disp Refills    levothyroxine (SYNTHROID) 75 MCG tablet 30 tablet 0     Sig: Take 1 tablet by mouth daily

## 2024-10-28 NOTE — TELEPHONE ENCOUNTER
Pt called to req an refill o\n the following    levothyroxine (SYNTHROID) 75 MCG tablet   Take 1 tablet by mouth daily     Please send 90 days to [facility]

## 2024-10-29 RX ORDER — LEVOTHYROXINE SODIUM 75 UG/1
75 TABLET ORAL DAILY
Qty: 30 TABLET | Refills: 0 | Status: SHIPPED | OUTPATIENT
Start: 2024-10-29

## 2024-11-11 DIAGNOSIS — I10 ESSENTIAL HYPERTENSION: ICD-10-CM

## 2024-11-11 RX ORDER — HYDROCHLOROTHIAZIDE 25 MG/1
TABLET ORAL
Qty: 90 TABLET | Refills: 0 | Status: SHIPPED | OUTPATIENT
Start: 2024-11-11

## 2024-11-11 NOTE — TELEPHONE ENCOUNTER
Pt called to req an refill on the following    hydroCHLOROthiazide (HYDRODIURIL) 25 MG tablet   TAKE 1 TABLET BY MOUTH EVERY DAY     Please send to [facility] pharmacy

## 2024-11-11 NOTE — TELEPHONE ENCOUNTER
Deborah called requesting a refill of the below medication which has been pended for you:     Requested Prescriptions     Pending Prescriptions Disp Refills    hydroCHLOROthiazide (HYDRODIURIL) 25 MG tablet 90 tablet 1     Sig: TAKE 1 TABLET BY MOUTH EVERY DAY       Last Appointment Date: 8/23/2024  Next Appointment Date: 1/3/2025    Allergies   Allergen Reactions    Morphine Nausea And Vomiting

## 2024-11-20 DIAGNOSIS — E03.9 ACQUIRED HYPOTHYROIDISM: ICD-10-CM

## 2024-11-20 RX ORDER — LEVOTHYROXINE SODIUM 75 UG/1
75 TABLET ORAL DAILY
Qty: 90 TABLET | Refills: 1 | Status: SHIPPED | OUTPATIENT
Start: 2024-11-20

## 2024-11-20 NOTE — TELEPHONE ENCOUNTER
Deborah called requesting a refill of the below medication which has been pended for you:     Requested Prescriptions     Pending Prescriptions Disp Refills    levothyroxine (SYNTHROID) 75 MCG tablet 90 tablet 1     Sig: Take 1 tablet by mouth daily       Last Appointment Date: 8/23/2024  Next Appointment Date: 1/3/2025    Allergies   Allergen Reactions    Morphine Nausea And Vomiting

## 2025-01-17 ENCOUNTER — OFFICE VISIT (OUTPATIENT)
Dept: FAMILY MEDICINE CLINIC | Age: 65
End: 2025-01-17

## 2025-01-17 VITALS
BODY MASS INDEX: 26.62 KG/M2 | DIASTOLIC BLOOD PRESSURE: 84 MMHG | WEIGHT: 141 LBS | HEART RATE: 68 BPM | RESPIRATION RATE: 12 BRPM | SYSTOLIC BLOOD PRESSURE: 132 MMHG | HEIGHT: 61 IN

## 2025-01-17 DIAGNOSIS — Z12.4 SCREENING FOR CERVICAL CANCER: ICD-10-CM

## 2025-01-17 DIAGNOSIS — I10 ESSENTIAL HYPERTENSION: Primary | ICD-10-CM

## 2025-01-17 DIAGNOSIS — E03.9 ACQUIRED HYPOTHYROIDISM: ICD-10-CM

## 2025-01-17 DIAGNOSIS — K21.9 GASTROESOPHAGEAL REFLUX DISEASE, UNSPECIFIED WHETHER ESOPHAGITIS PRESENT: ICD-10-CM

## 2025-01-17 PROCEDURE — 3017F COLORECTAL CA SCREEN DOC REV: CPT | Performed by: FAMILY MEDICINE

## 2025-01-17 PROCEDURE — 1036F TOBACCO NON-USER: CPT | Performed by: FAMILY MEDICINE

## 2025-01-17 PROCEDURE — 1090F PRES/ABSN URINE INCON ASSESS: CPT | Performed by: FAMILY MEDICINE

## 2025-01-17 PROCEDURE — 1123F ACP DISCUSS/DSCN MKR DOCD: CPT | Performed by: FAMILY MEDICINE

## 2025-01-17 PROCEDURE — G8400 PT W/DXA NO RESULTS DOC: HCPCS | Performed by: FAMILY MEDICINE

## 2025-01-17 PROCEDURE — G8419 CALC BMI OUT NRM PARAM NOF/U: HCPCS | Performed by: FAMILY MEDICINE

## 2025-01-17 PROCEDURE — 99214 OFFICE O/P EST MOD 30 MIN: CPT | Performed by: FAMILY MEDICINE

## 2025-01-17 PROCEDURE — G8427 DOCREV CUR MEDS BY ELIG CLIN: HCPCS | Performed by: FAMILY MEDICINE

## 2025-01-17 RX ORDER — LEVOTHYROXINE SODIUM 75 UG/1
75 TABLET ORAL DAILY
Qty: 90 TABLET | Refills: 1 | Status: SHIPPED | OUTPATIENT
Start: 2025-01-17

## 2025-01-17 RX ORDER — HYDROCHLOROTHIAZIDE 25 MG/1
TABLET ORAL
Qty: 90 TABLET | Refills: 1 | Status: SHIPPED | OUTPATIENT
Start: 2025-01-17

## 2025-01-17 SDOH — ECONOMIC STABILITY: FOOD INSECURITY: WITHIN THE PAST 12 MONTHS, YOU WORRIED THAT YOUR FOOD WOULD RUN OUT BEFORE YOU GOT MONEY TO BUY MORE.: NEVER TRUE

## 2025-01-17 SDOH — ECONOMIC STABILITY: FOOD INSECURITY: WITHIN THE PAST 12 MONTHS, THE FOOD YOU BOUGHT JUST DIDN'T LAST AND YOU DIDN'T HAVE MONEY TO GET MORE.: NEVER TRUE

## 2025-01-17 ASSESSMENT — PATIENT HEALTH QUESTIONNAIRE - PHQ9
SUM OF ALL RESPONSES TO PHQ QUESTIONS 1-9: 0
1. LITTLE INTEREST OR PLEASURE IN DOING THINGS: NOT AT ALL
2. FEELING DOWN, DEPRESSED OR HOPELESS: NOT AT ALL
SUM OF ALL RESPONSES TO PHQ QUESTIONS 1-9: 0
SUM OF ALL RESPONSES TO PHQ9 QUESTIONS 1 & 2: 0

## 2025-01-17 NOTE — PROGRESS NOTES
Date: 1/17/2025    Deborah Barba is a 64 y.o. female who presents today for:  Chief Complaint   Patient presents with    Hypothyroidism    Hypertension    Gynecologic Exam       HPI:     Hypertension  This is a chronic problem. The current episode started more than 1 year ago. The problem is unchanged. The problem is controlled. Pertinent negatives include no blurred vision, chest pain, headaches, neck pain, palpitations, peripheral edema, PND or shortness of breath. Risk factors for coronary artery disease include dyslipidemia and post-menopausal state. Past treatments include diuretics. The current treatment provides significant improvement. There are no compliance problems.        has a current medication list which includes the following prescription(s): hydrochlorothiazide, levothyroxine, omeprazole, cyclosporine, calcium carbonate-vitamin d, aspirin, multiple vitamin, bisacodyl, and polyethylene glycol.    Allergies   Allergen Reactions    Morphine Nausea And Vomiting       Social History     Tobacco Use    Smoking status: Never    Smokeless tobacco: Never   Substance Use Topics    Alcohol use: No    Drug use: No       Past Medical History:   Diagnosis Date    Anemia     Diverticulosis 10/10    Hypertension 5/06    Hypothyroidism 10/14/05    Mild carotid artery disease (HCC) 7/06    Unspecified sleep apnea 10/05       Past Surgical History:   Procedure Laterality Date    BLADDER SUSPENSION      COLONOSCOPY  10/10 01/11    DILATION AND CURETTAGE OF UTERUS  1985/ 1988    HYSTERECTOMY (CERVIX STATUS UNKNOWN)  6/01    TUBAL LIGATION  1989       Family History   Problem Relation Age of Onset    Cancer Mother     Lung Cancer Mother     Heart Disease Father      Subjective:     Review of Systems   Constitutional:  Negative for activity change, appetite change, diaphoresis, fatigue and fever.        She is exercising and trying to loose with also watching her diet.   HENT: Negative.     Eyes: Negative.  Negative

## 2025-01-21 LAB
CPT CODE: NORMAL
CYTOTECHNOLOGIST: NORMAL
INTERPRETATION:: NORMAL
LAST MENSTRUAL PERIOD: NORMAL
LOCATION: NORMAL
Lab: NORMAL
SLIDES: 1
SOURCE: NORMAL
SPECIMEN ADEQUACY:: NORMAL

## 2025-02-12 ASSESSMENT — ENCOUNTER SYMPTOMS
ABDOMINAL PAIN: 0
CONSTIPATION: 0
DIARRHEA: 0
VOMITING: 0
BLURRED VISION: 0
NAUSEA: 0

## 2025-05-07 ENCOUNTER — OFFICE VISIT (OUTPATIENT)
Dept: FAMILY MEDICINE CLINIC | Age: 65
End: 2025-05-07

## 2025-05-07 VITALS
HEIGHT: 61 IN | RESPIRATION RATE: 16 BRPM | HEART RATE: 72 BPM | SYSTOLIC BLOOD PRESSURE: 118 MMHG | BODY MASS INDEX: 24.99 KG/M2 | DIASTOLIC BLOOD PRESSURE: 72 MMHG | WEIGHT: 132.38 LBS

## 2025-05-07 DIAGNOSIS — K21.9 GASTROESOPHAGEAL REFLUX DISEASE, UNSPECIFIED WHETHER ESOPHAGITIS PRESENT: ICD-10-CM

## 2025-05-07 DIAGNOSIS — E03.9 ACQUIRED HYPOTHYROIDISM: ICD-10-CM

## 2025-05-07 DIAGNOSIS — M10.9 ACUTE GOUT OF RIGHT FOOT, UNSPECIFIED CAUSE: ICD-10-CM

## 2025-05-07 DIAGNOSIS — E78.5 HYPERLIPIDEMIA, UNSPECIFIED HYPERLIPIDEMIA TYPE: ICD-10-CM

## 2025-05-07 DIAGNOSIS — I10 ESSENTIAL HYPERTENSION: Primary | ICD-10-CM

## 2025-05-07 LAB
ALBUMIN: 4.1 G/DL
ALP BLD-CCNC: 112 U/L
ALT SERPL-CCNC: 13 U/L
ANION GAP SERPL CALCULATED.3IONS-SCNC: ABNORMAL MMOL/L
AST SERPL-CCNC: 19 U/L
BASOPHILS ABSOLUTE: 0.1 /ΜL
BASOPHILS RELATIVE PERCENT: 1 %
BILIRUB SERPL-MCNC: 0.4 MG/DL (ref 0.1–1.4)
BUN BLDV-MCNC: 14 MG/DL
CALCIUM SERPL-MCNC: 9.3 MG/DL
CHLORIDE BLD-SCNC: 104 MMOL/L
CHOLESTEROL, TOTAL: 164 MG/DL
CHOLESTEROL/HDL RATIO: NORMAL
CO2: 24 MMOL/L
CREAT SERPL-MCNC: 0.71 MG/DL
EOSINOPHILS ABSOLUTE: 0.4 /ΜL
EOSINOPHILS RELATIVE PERCENT: 4 %
GFR, ESTIMATED: 94
GLUCOSE BLD-MCNC: 88 MG/DL
HCT VFR BLD CALC: 38.1 % (ref 36–46)
HDLC SERPL-MCNC: 47 MG/DL (ref 35–70)
HEMOGLOBIN: 12.2 G/DL (ref 12–16)
LDL CHOLESTEROL: 105
LYMPHOCYTES ABSOLUTE: 1.9 /ΜL
LYMPHOCYTES RELATIVE PERCENT: 21 %
MCH RBC QN AUTO: 30.2 PG
MCHC RBC AUTO-ENTMCNC: 32 G/DL
MCV RBC AUTO: 94 FL
MONOCYTES ABSOLUTE: 0.7 /ΜL
MONOCYTES RELATIVE PERCENT: 8 %
NEUTROPHILS ABSOLUTE: 6.3 /ΜL
NEUTROPHILS RELATIVE PERCENT: 66 %
NONHDLC SERPL-MCNC: NORMAL MG/DL
PDW BLD-RTO: 12.7 %
PLATELET # BLD: 411 K/ΜL
PMV BLD AUTO: NORMAL FL
POTASSIUM SERPL-SCNC: 4.3 MMOL/L
RBC # BLD: 4.04 10^6/ΜL
SODIUM BLD-SCNC: 145 MMOL/L
TOTAL PROTEIN: 4.3 G/DL (ref 6.4–8.2)
TRIGL SERPL-MCNC: 61 MG/DL
TSH SERPL DL<=0.05 MIU/L-ACNC: 0.06 UIU/ML
URIC ACID: 7.1
VLDLC SERPL CALC-MCNC: 12 MG/DL
WBC # BLD: 9.4 10^3/ML

## 2025-05-07 PROCEDURE — 1123F ACP DISCUSS/DSCN MKR DOCD: CPT | Performed by: FAMILY MEDICINE

## 2025-05-07 PROCEDURE — 1090F PRES/ABSN URINE INCON ASSESS: CPT | Performed by: FAMILY MEDICINE

## 2025-05-07 PROCEDURE — G8419 CALC BMI OUT NRM PARAM NOF/U: HCPCS | Performed by: FAMILY MEDICINE

## 2025-05-07 PROCEDURE — 3017F COLORECTAL CA SCREEN DOC REV: CPT | Performed by: FAMILY MEDICINE

## 2025-05-07 PROCEDURE — G8400 PT W/DXA NO RESULTS DOC: HCPCS | Performed by: FAMILY MEDICINE

## 2025-05-07 PROCEDURE — 1036F TOBACCO NON-USER: CPT | Performed by: FAMILY MEDICINE

## 2025-05-07 PROCEDURE — G8427 DOCREV CUR MEDS BY ELIG CLIN: HCPCS | Performed by: FAMILY MEDICINE

## 2025-05-07 PROCEDURE — 99214 OFFICE O/P EST MOD 30 MIN: CPT | Performed by: FAMILY MEDICINE

## 2025-05-07 RX ORDER — LEVOTHYROXINE SODIUM 75 UG/1
75 TABLET ORAL DAILY
Qty: 90 TABLET | Refills: 1 | Status: SHIPPED | OUTPATIENT
Start: 2025-05-07

## 2025-05-07 RX ORDER — HYDROCHLOROTHIAZIDE 25 MG/1
TABLET ORAL
Qty: 90 TABLET | Refills: 1 | Status: SHIPPED | OUTPATIENT
Start: 2025-05-07

## 2025-05-07 RX ORDER — INDOMETHACIN 25 MG/1
25 CAPSULE ORAL 3 TIMES DAILY
Qty: 30 CAPSULE | Refills: 1 | Status: SHIPPED | OUTPATIENT
Start: 2025-05-07 | End: 2025-05-27

## 2025-05-07 ASSESSMENT — ENCOUNTER SYMPTOMS
VOMITING: 0
EYES NEGATIVE: 1
SHORTNESS OF BREATH: 0
NAUSEA: 0
CONSTIPATION: 0
ABDOMINAL PAIN: 0
DIARRHEA: 0
COUGH: 0
CHEST TIGHTNESS: 0

## 2025-05-07 NOTE — PROGRESS NOTES
Date: 5/7/2025    Deborah Barba is a 64 y.o. female who presents today for:  Chief Complaint   Patient presents with    Gout     Right Foot she has pain, swelling,hot, redness to her right since Monday.     Hypertension       HPI:     Hypertension  This is a chronic problem. The current episode started more than 1 year ago. The problem is unchanged. The problem is controlled. Pertinent negatives include no chest pain, headaches, neck pain, palpitations, peripheral edema, PND or shortness of breath. Risk factors for coronary artery disease include post-menopausal state. Past treatments include diuretics. The current treatment provides significant improvement. There are no compliance problems (she exercises twise a day for 20 minutes).        has a current medication list which includes the following prescription(s): hydrochlorothiazide, levothyroxine, indomethacin, omeprazole, cyclosporine, calcium carbonate-vitamin d, aspirin, and multiple vitamin.    Allergies   Allergen Reactions    Morphine Nausea And Vomiting       Social History     Tobacco Use    Smoking status: Never    Smokeless tobacco: Never   Substance Use Topics    Alcohol use: No    Drug use: No       Past Medical History:   Diagnosis Date    Anemia     Diverticulosis 10/10    Hypertension 5/06    Hypothyroidism 10/14/05    Mild carotid artery disease 7/06    Unspecified sleep apnea 10/05       Past Surgical History:   Procedure Laterality Date    BLADDER SUSPENSION      COLONOSCOPY  10/10 01/11    DILATION AND CURETTAGE OF UTERUS  1985/ 1988    HYSTERECTOMY (CERVIX STATUS UNKNOWN)  6/01    TUBAL LIGATION  1989       Family History   Problem Relation Age of Onset    Cancer Mother     Lung Cancer Mother     Heart Disease Father      Subjective:     Review of Systems   Constitutional:  Negative for activity change, appetite change, diaphoresis, fatigue and fever.   HENT: Negative.     Eyes: Negative.    Respiratory:  Negative for cough, chest  Walk in

## 2025-05-08 DIAGNOSIS — I10 ESSENTIAL HYPERTENSION: ICD-10-CM

## 2025-05-08 DIAGNOSIS — E03.9 ACQUIRED HYPOTHYROIDISM: ICD-10-CM

## 2025-05-08 DIAGNOSIS — M10.9 ACUTE GOUT OF RIGHT FOOT, UNSPECIFIED CAUSE: ICD-10-CM

## 2025-05-27 ENCOUNTER — RESULTS FOLLOW-UP (OUTPATIENT)
Dept: FAMILY MEDICINE CLINIC | Age: 65
End: 2025-05-27

## 2025-05-27 DIAGNOSIS — R79.89 LOW SERUM TOTAL PROTEIN LEVEL: Primary | ICD-10-CM

## 2025-05-27 NOTE — TELEPHONE ENCOUNTER
Pt informed. Labs ordered and faxed to [facility] per pt request. The pt said that she will need to focus on Protein intake as she only eats protein one time a day.

## 2025-06-02 ENCOUNTER — RESULTS FOLLOW-UP (OUTPATIENT)
Dept: FAMILY MEDICINE CLINIC | Age: 65
End: 2025-06-02

## 2025-06-02 DIAGNOSIS — E03.9 ACQUIRED HYPOTHYROIDISM: Primary | ICD-10-CM

## 2025-06-02 DIAGNOSIS — R79.89 LOW SERUM TOTAL PROTEIN LEVEL: ICD-10-CM

## 2025-06-03 NOTE — TELEPHONE ENCOUNTER
Pt informed.    Pt ask if you feel her TSH is okay being low?    Pt is having extra fatigue and hair loss    [facility]    Please call pt once addressed

## 2025-06-09 NOTE — TELEPHONE ENCOUNTER
Pt called to ask if her message from 6/3/25 to  had been addressed.  Please address message.      I did tell pt to call back on Wednesday if she has not heard from us.

## 2025-06-12 LAB
T4 FREE: 1.4
TSH SERPL DL<=0.05 MIU/L-ACNC: 0.11 UIU/ML

## 2025-06-13 ENCOUNTER — RESULTS FOLLOW-UP (OUTPATIENT)
Dept: FAMILY MEDICINE CLINIC | Age: 65
End: 2025-06-13

## 2025-06-13 DIAGNOSIS — E03.9 ACQUIRED HYPOTHYROIDISM: ICD-10-CM
